# Patient Record
Sex: MALE
[De-identification: names, ages, dates, MRNs, and addresses within clinical notes are randomized per-mention and may not be internally consistent; named-entity substitution may affect disease eponyms.]

---

## 2017-07-30 ENCOUNTER — CHARTING TRANS (OUTPATIENT)
Dept: OTHER | Age: 27
End: 2017-07-30

## 2018-11-03 VITALS
HEART RATE: 92 BPM | HEIGHT: 69 IN | SYSTOLIC BLOOD PRESSURE: 150 MMHG | DIASTOLIC BLOOD PRESSURE: 100 MMHG | TEMPERATURE: 98.9 F | RESPIRATION RATE: 16 BRPM | WEIGHT: 191.91 LBS | BODY MASS INDEX: 28.42 KG/M2

## 2020-07-30 ENCOUNTER — APPOINTMENT (OUTPATIENT)
Dept: URGENT CARE | Age: 30
End: 2020-07-30

## 2020-07-30 ENCOUNTER — TELEPHONE (OUTPATIENT)
Dept: SCHEDULING | Age: 30
End: 2020-07-30

## 2020-08-01 ENCOUNTER — WALK IN (OUTPATIENT)
Dept: URGENT CARE | Age: 30
End: 2020-08-01

## 2020-08-01 ENCOUNTER — TELEPHONE (OUTPATIENT)
Dept: SCHEDULING | Age: 30
End: 2020-08-01

## 2020-08-01 VITALS — TEMPERATURE: 99.2 F

## 2020-08-01 DIAGNOSIS — H10.31 ACUTE BACTERIAL CONJUNCTIVITIS OF RIGHT EYE: Primary | ICD-10-CM

## 2020-08-01 PROCEDURE — 99203 OFFICE O/P NEW LOW 30 MIN: CPT | Performed by: NURSE PRACTITIONER

## 2020-08-01 RX ORDER — TOBRAMYCIN 3 MG/ML
1 SOLUTION/ DROPS OPHTHALMIC 4 TIMES DAILY
Qty: 5 ML | Refills: 0 | Status: SHIPPED | OUTPATIENT
Start: 2020-08-01 | End: 2020-08-08

## 2020-08-01 ASSESSMENT — ENCOUNTER SYMPTOMS
EYE PAIN: 0
EYE DISCHARGE: 1
PSYCHIATRIC NEGATIVE: 1
CONSTITUTIONAL NEGATIVE: 1
EYE REDNESS: 1
GASTROINTESTINAL NEGATIVE: 1
ALLERGIC/IMMUNOLOGIC NEGATIVE: 1
HEMATOLOGIC/LYMPHATIC NEGATIVE: 1
PHOTOPHOBIA: 0
RESPIRATORY NEGATIVE: 1
EYE ITCHING: 0
NEUROLOGICAL NEGATIVE: 1

## 2021-01-01 ENCOUNTER — HOSPITAL ENCOUNTER (INPATIENT)
Dept: GENERAL RADIOLOGY | Facility: HOSPITAL | Age: 31
Discharge: HOME | DRG: 974 | End: 2021-01-01
Attending: RADIOLOGY
Payer: MEDICAID

## 2021-01-01 ENCOUNTER — APPOINTMENT (OUTPATIENT)
Dept: GENERAL RADIOLOGY | Facility: HOSPITAL | Age: 31
DRG: 974 | End: 2021-01-01
Attending: INTERNAL MEDICINE
Payer: MEDICAID

## 2021-01-01 ENCOUNTER — APPOINTMENT (OUTPATIENT)
Dept: GENERAL RADIOLOGY | Facility: HOSPITAL | Age: 31
DRG: 974 | End: 2021-01-01
Attending: EMERGENCY MEDICINE
Payer: MEDICAID

## 2021-01-01 ENCOUNTER — HOSPITAL ENCOUNTER (INPATIENT)
Facility: HOSPITAL | Age: 31
LOS: 24 days | DRG: 974 | End: 2021-01-01
Attending: EMERGENCY MEDICINE | Admitting: INTERNAL MEDICINE
Payer: MEDICAID

## 2021-01-01 ENCOUNTER — APPOINTMENT (OUTPATIENT)
Dept: GENERAL RADIOLOGY | Facility: HOSPITAL | Age: 31
DRG: 974 | End: 2021-01-01
Attending: CLINICAL NURSE SPECIALIST
Payer: MEDICAID

## 2021-01-01 ENCOUNTER — ANESTHESIA (OUTPATIENT)
Dept: ENDOSCOPY | Facility: HOSPITAL | Age: 31
DRG: 974 | End: 2021-01-01
Payer: MEDICAID

## 2021-01-01 ENCOUNTER — APPOINTMENT (OUTPATIENT)
Dept: GENERAL RADIOLOGY | Facility: HOSPITAL | Age: 31
DRG: 974 | End: 2021-01-01
Attending: HOSPITALIST
Payer: MEDICAID

## 2021-01-01 ENCOUNTER — ANESTHESIA EVENT (OUTPATIENT)
Dept: ENDOSCOPY | Facility: HOSPITAL | Age: 31
DRG: 974 | End: 2021-01-01
Payer: MEDICAID

## 2021-01-01 ENCOUNTER — APPOINTMENT (OUTPATIENT)
Dept: CV DIAGNOSTICS | Facility: HOSPITAL | Age: 31
DRG: 974 | End: 2021-01-01
Attending: HOSPITALIST
Payer: MEDICAID

## 2021-01-01 ENCOUNTER — APPOINTMENT (OUTPATIENT)
Dept: INTERVENTIONAL RADIOLOGY/VASCULAR | Facility: HOSPITAL | Age: 31
DRG: 974 | End: 2021-01-01
Attending: INTERNAL MEDICINE
Payer: MEDICAID

## 2021-01-01 ENCOUNTER — APPOINTMENT (OUTPATIENT)
Dept: ULTRASOUND IMAGING | Facility: HOSPITAL | Age: 31
DRG: 974 | End: 2021-01-01
Attending: INTERNAL MEDICINE
Payer: MEDICAID

## 2021-01-01 ENCOUNTER — APPOINTMENT (OUTPATIENT)
Dept: CT IMAGING | Facility: HOSPITAL | Age: 31
DRG: 974 | End: 2021-01-01
Attending: EMERGENCY MEDICINE
Payer: MEDICAID

## 2021-01-01 VITALS
DIASTOLIC BLOOD PRESSURE: 52 MMHG | WEIGHT: 182.75 LBS | OXYGEN SATURATION: 92 % | SYSTOLIC BLOOD PRESSURE: 97 MMHG | TEMPERATURE: 98 F | BODY MASS INDEX: 27.07 KG/M2 | HEIGHT: 69 IN

## 2021-01-01 DIAGNOSIS — R09.02 HYPOXIA: Primary | ICD-10-CM

## 2021-01-01 DIAGNOSIS — J18.9 PNEUMONIA OF BOTH LUNGS DUE TO INFECTIOUS ORGANISM, UNSPECIFIED PART OF LUNG: ICD-10-CM

## 2021-01-01 PROCEDURE — 71045 X-RAY EXAM CHEST 1 VIEW: CPT | Performed by: INTERNAL MEDICINE

## 2021-01-01 PROCEDURE — 5A1D90Z PERFORMANCE OF URINARY FILTRATION, CONTINUOUS, GREATER THAN 18 HOURS PER DAY: ICD-10-PCS | Performed by: INTERNAL MEDICINE

## 2021-01-01 PROCEDURE — 5A0945Z ASSISTANCE WITH RESPIRATORY VENTILATION, 24-96 CONSECUTIVE HOURS: ICD-10-PCS | Performed by: HOSPITALIST

## 2021-01-01 PROCEDURE — 0W9B00Z DRAINAGE OF LEFT PLEURAL CAVITY WITH DRAINAGE DEVICE, OPEN APPROACH: ICD-10-PCS | Performed by: INTERNAL MEDICINE

## 2021-01-01 PROCEDURE — 99233 SBSQ HOSP IP/OBS HIGH 50: CPT | Performed by: INTERNAL MEDICINE

## 2021-01-01 PROCEDURE — 0B9K8ZX DRAINAGE OF RIGHT LUNG, VIA NATURAL OR ARTIFICIAL OPENING ENDOSCOPIC, DIAGNOSTIC: ICD-10-PCS | Performed by: INTERNAL MEDICINE

## 2021-01-01 PROCEDURE — 71045 X-RAY EXAM CHEST 1 VIEW: CPT | Performed by: HOSPITALIST

## 2021-01-01 PROCEDURE — 99233 SBSQ HOSP IP/OBS HIGH 50: CPT | Performed by: OTHER

## 2021-01-01 PROCEDURE — 71045 X-RAY EXAM CHEST 1 VIEW: CPT | Performed by: EMERGENCY MEDICINE

## 2021-01-01 PROCEDURE — 5A1955Z RESPIRATORY VENTILATION, GREATER THAN 96 CONSECUTIVE HOURS: ICD-10-PCS | Performed by: HOSPITALIST

## 2021-01-01 PROCEDURE — 99255 IP/OBS CONSLTJ NEW/EST HI 80: CPT | Performed by: INTERNAL MEDICINE

## 2021-01-01 PROCEDURE — 71260 CT THORAX DX C+: CPT | Performed by: EMERGENCY MEDICINE

## 2021-01-01 PROCEDURE — 99291 CRITICAL CARE FIRST HOUR: CPT | Performed by: INTERNAL MEDICINE

## 2021-01-01 PROCEDURE — 31624 DX BRONCHOSCOPE/LAVAGE: CPT | Performed by: INTERNAL MEDICINE

## 2021-01-01 PROCEDURE — 99291 CRITICAL CARE FIRST HOUR: CPT | Performed by: OTHER

## 2021-01-01 PROCEDURE — 3E033XZ INTRODUCTION OF VASOPRESSOR INTO PERIPHERAL VEIN, PERCUTANEOUS APPROACH: ICD-10-PCS | Performed by: HOSPITALIST

## 2021-01-01 PROCEDURE — 76770 US EXAM ABDO BACK WALL COMP: CPT | Performed by: INTERNAL MEDICINE

## 2021-01-01 PROCEDURE — 02H633Z INSERTION OF INFUSION DEVICE INTO RIGHT ATRIUM, PERCUTANEOUS APPROACH: ICD-10-PCS | Performed by: RADIOLOGY

## 2021-01-01 PROCEDURE — 71045 X-RAY EXAM CHEST 1 VIEW: CPT | Performed by: CLINICAL NURSE SPECIALIST

## 2021-01-01 PROCEDURE — 99232 SBSQ HOSP IP/OBS MODERATE 35: CPT | Performed by: INTERNAL MEDICINE

## 2021-01-01 PROCEDURE — 02H633Z INSERTION OF INFUSION DEVICE INTO RIGHT ATRIUM, PERCUTANEOUS APPROACH: ICD-10-PCS | Performed by: HOSPITALIST

## 2021-01-01 PROCEDURE — 32556 INSERT CATH PLEURA W/O IMAGE: CPT | Performed by: INTERNAL MEDICINE

## 2021-01-01 PROCEDURE — 0JH63XZ INSERTION OF TUNNELED VASCULAR ACCESS DEVICE INTO CHEST SUBCUTANEOUS TISSUE AND FASCIA, PERCUTANEOUS APPROACH: ICD-10-PCS | Performed by: RADIOLOGY

## 2021-01-01 PROCEDURE — 93970 EXTREMITY STUDY: CPT | Performed by: INTERNAL MEDICINE

## 2021-01-01 PROCEDURE — 71045 X-RAY EXAM CHEST 1 VIEW: CPT | Performed by: RADIOLOGY

## 2021-01-01 PROCEDURE — 0W9900Z DRAINAGE OF RIGHT PLEURAL CAVITY WITH DRAINAGE DEVICE, OPEN APPROACH: ICD-10-PCS | Performed by: INTERNAL MEDICINE

## 2021-01-01 PROCEDURE — 93306 TTE W/DOPPLER COMPLETE: CPT | Performed by: HOSPITALIST

## 2021-01-01 PROCEDURE — 0BH17EZ INSERTION OF ENDOTRACHEAL AIRWAY INTO TRACHEA, VIA NATURAL OR ARTIFICIAL OPENING: ICD-10-PCS | Performed by: HOSPITALIST

## 2021-01-01 PROCEDURE — 5A09357 ASSISTANCE WITH RESPIRATORY VENTILATION, LESS THAN 24 CONSECUTIVE HOURS, CONTINUOUS POSITIVE AIRWAY PRESSURE: ICD-10-PCS | Performed by: HOSPITALIST

## 2021-01-01 PROCEDURE — 90792 PSYCH DIAG EVAL W/MED SRVCS: CPT | Performed by: OTHER

## 2021-01-01 RX ORDER — HEPARIN SODIUM 1000 [USP'U]/ML
INJECTION, SOLUTION INTRAVENOUS; SUBCUTANEOUS
Status: COMPLETED
Start: 2021-01-01 | End: 2021-01-01

## 2021-01-01 RX ORDER — ALPRAZOLAM 1 MG/1
2 TABLET ORAL 4 TIMES DAILY PRN
Status: DISCONTINUED | OUTPATIENT
Start: 2021-01-01 | End: 2021-01-01

## 2021-01-01 RX ORDER — DEXMEDETOMIDINE HYDROCHLORIDE 4 UG/ML
INJECTION, SOLUTION INTRAVENOUS CONTINUOUS
Status: DISCONTINUED | OUTPATIENT
Start: 2021-01-01 | End: 2021-01-01

## 2021-01-01 RX ORDER — HALOPERIDOL 5 MG/ML
0.25 INJECTION INTRAMUSCULAR ONCE AS NEEDED
Status: DISCONTINUED | OUTPATIENT
Start: 2021-01-01 | End: 2021-01-01 | Stop reason: HOSPADM

## 2021-01-01 RX ORDER — LORAZEPAM 2 MG/ML
1 INJECTION INTRAMUSCULAR EVERY 4 HOURS PRN
Status: DISCONTINUED | OUTPATIENT
Start: 2021-01-01 | End: 2021-01-01

## 2021-01-01 RX ORDER — HEPARIN SODIUM 1000 [USP'U]/ML
1.5 INJECTION, SOLUTION INTRAVENOUS; SUBCUTANEOUS AS NEEDED
Status: DISCONTINUED | OUTPATIENT
Start: 2021-01-01 | End: 2021-01-01

## 2021-01-01 RX ORDER — CALCIUM CARBONATE 200(500)MG
1 TABLET,CHEWABLE ORAL NIGHTLY PRN
COMMUNITY

## 2021-01-01 RX ORDER — HYDROCODONE BITARTRATE AND ACETAMINOPHEN 5; 325 MG/1; MG/1
2 TABLET ORAL AS NEEDED
Status: DISCONTINUED | OUTPATIENT
Start: 2021-01-01 | End: 2021-01-01 | Stop reason: HOSPADM

## 2021-01-01 RX ORDER — MORPHINE SULFATE 4 MG/ML
INJECTION, SOLUTION INTRAMUSCULAR; INTRAVENOUS
Status: COMPLETED
Start: 2021-01-01 | End: 2021-01-01

## 2021-01-01 RX ORDER — HYDROCODONE BITARTRATE AND ACETAMINOPHEN 5; 325 MG/1; MG/1
1 TABLET ORAL AS NEEDED
Status: DISCONTINUED | OUTPATIENT
Start: 2021-01-01 | End: 2021-01-01 | Stop reason: HOSPADM

## 2021-01-01 RX ORDER — ALBUMIN (HUMAN) 12.5 G/50ML
SOLUTION INTRAVENOUS
Status: COMPLETED
Start: 2021-01-01 | End: 2021-01-01

## 2021-01-01 RX ORDER — LORAZEPAM 2 MG/ML
INJECTION INTRAMUSCULAR
Status: DISPENSED
Start: 2021-01-01 | End: 2021-01-01

## 2021-01-01 RX ORDER — HEPARIN SODIUM 5000 [USP'U]/ML
5000 INJECTION, SOLUTION INTRAVENOUS; SUBCUTANEOUS EVERY 8 HOURS SCHEDULED
Status: DISCONTINUED | OUTPATIENT
Start: 2021-01-01 | End: 2021-01-01

## 2021-01-01 RX ORDER — PREDNISONE 20 MG/1
40 TABLET ORAL 2 TIMES DAILY WITH MEALS
Status: COMPLETED | OUTPATIENT
Start: 2021-01-01 | End: 2021-01-01

## 2021-01-01 RX ORDER — MORPHINE SULFATE 10 MG/ML
6 INJECTION, SOLUTION INTRAMUSCULAR; INTRAVENOUS EVERY 10 MIN PRN
Status: DISCONTINUED | OUTPATIENT
Start: 2021-01-01 | End: 2021-01-01 | Stop reason: HOSPADM

## 2021-01-01 RX ORDER — CODEINE PHOSPHATE AND GUAIFENESIN 10; 100 MG/5ML; MG/5ML
5 SOLUTION ORAL EVERY 6 HOURS PRN
Status: DISCONTINUED | OUTPATIENT
Start: 2021-01-01 | End: 2021-01-01

## 2021-01-01 RX ORDER — POTASSIUM CHLORIDE 14.9 MG/ML
20 INJECTION INTRAVENOUS ONCE
Status: DISCONTINUED | OUTPATIENT
Start: 2021-01-01 | End: 2021-01-01

## 2021-01-01 RX ORDER — PREDNISONE 1 MG/1
5 TABLET ORAL
Status: DISCONTINUED | OUTPATIENT
Start: 2021-01-01 | End: 2021-01-01

## 2021-01-01 RX ORDER — DEXMEDETOMIDINE HYDROCHLORIDE 4 UG/ML
INJECTION, SOLUTION INTRAVENOUS
Status: COMPLETED
Start: 2021-01-01 | End: 2021-01-01

## 2021-01-01 RX ORDER — BENZONATATE 100 MG/1
100 CAPSULE ORAL 3 TIMES DAILY PRN
COMMUNITY

## 2021-01-01 RX ORDER — PROCHLORPERAZINE EDISYLATE 5 MG/ML
5 INJECTION INTRAMUSCULAR; INTRAVENOUS ONCE AS NEEDED
Status: DISCONTINUED | OUTPATIENT
Start: 2021-01-01 | End: 2021-01-01 | Stop reason: HOSPADM

## 2021-01-01 RX ORDER — IBUPROFEN 200 MG
200 TABLET ORAL EVERY 6 HOURS PRN
Status: DISCONTINUED | OUTPATIENT
Start: 2021-01-01 | End: 2021-01-01

## 2021-01-01 RX ORDER — DEXTROSE MONOHYDRATE 25 G/50ML
50 INJECTION, SOLUTION INTRAVENOUS AS NEEDED
Status: DISCONTINUED | OUTPATIENT
Start: 2021-01-01 | End: 2021-01-01

## 2021-01-01 RX ORDER — ERGOCALCIFEROL 1.25 MG/1
CAPSULE ORAL
COMMUNITY

## 2021-01-01 RX ORDER — SODIUM CHLORIDE 9 MG/ML
INJECTION, SOLUTION INTRAVENOUS CONTINUOUS
Status: DISCONTINUED | OUTPATIENT
Start: 2021-01-01 | End: 2021-01-01

## 2021-01-01 RX ORDER — POTASSIUM CHLORIDE 1.5 G/1.77G
40 POWDER, FOR SOLUTION ORAL ONCE
Status: COMPLETED | OUTPATIENT
Start: 2021-01-01 | End: 2021-01-01

## 2021-01-01 RX ORDER — IBUPROFEN 200 MG
200 TABLET ORAL EVERY 6 HOURS PRN
COMMUNITY

## 2021-01-01 RX ORDER — HEPARIN SODIUM 5000 [USP'U]/ML
5000 INJECTION, SOLUTION INTRAVENOUS; SUBCUTANEOUS ONCE
Status: COMPLETED | OUTPATIENT
Start: 2021-01-01 | End: 2021-01-01

## 2021-01-01 RX ORDER — PREDNISONE 20 MG/1
20 TABLET ORAL
Status: DISCONTINUED | OUTPATIENT
Start: 2021-01-01 | End: 2021-01-01

## 2021-01-01 RX ORDER — SODIUM POLYSTYRENE SULFONATE 4.1 MEQ/G
60 POWDER, FOR SUSPENSION ORAL; RECTAL ONCE
Status: DISCONTINUED | OUTPATIENT
Start: 2021-01-01 | End: 2021-01-01

## 2021-01-01 RX ORDER — SENNOSIDES 8.8 MG/5ML
5 LIQUID ORAL 2 TIMES DAILY
Status: DISCONTINUED | OUTPATIENT
Start: 2021-01-01 | End: 2021-01-01

## 2021-01-01 RX ORDER — HYDROMORPHONE HYDROCHLORIDE 1 MG/ML
0.2 INJECTION, SOLUTION INTRAMUSCULAR; INTRAVENOUS; SUBCUTANEOUS EVERY 5 MIN PRN
Status: DISCONTINUED | OUTPATIENT
Start: 2021-01-01 | End: 2021-01-01 | Stop reason: HOSPADM

## 2021-01-01 RX ORDER — PANTOPRAZOLE SODIUM 40 MG/1
40 TABLET, DELAYED RELEASE ORAL
Status: DISCONTINUED | OUTPATIENT
Start: 2021-01-01 | End: 2021-01-01

## 2021-01-01 RX ORDER — SODIUM CHLORIDE, SODIUM LACTATE, POTASSIUM CHLORIDE, CALCIUM CHLORIDE 600; 310; 30; 20 MG/100ML; MG/100ML; MG/100ML; MG/100ML
INJECTION, SOLUTION INTRAVENOUS CONTINUOUS PRN
Status: DISCONTINUED | OUTPATIENT
Start: 2021-01-01 | End: 2021-01-01 | Stop reason: SURG

## 2021-01-01 RX ORDER — LIDOCAINE HYDROCHLORIDE 20 MG/ML
INJECTION, SOLUTION EPIDURAL; INFILTRATION; INTRACAUDAL; PERINEURAL
Status: DISCONTINUED
Start: 2021-01-01 | End: 2021-01-01 | Stop reason: WASHOUT

## 2021-01-01 RX ORDER — ENOXAPARIN SODIUM 100 MG/ML
40 INJECTION SUBCUTANEOUS NIGHTLY
Status: DISCONTINUED | OUTPATIENT
Start: 2021-01-01 | End: 2021-01-01

## 2021-01-01 RX ORDER — PREDNISONE 20 MG/1
40 TABLET ORAL
Status: DISCONTINUED | OUTPATIENT
Start: 2021-01-01 | End: 2021-01-01

## 2021-01-01 RX ORDER — LORAZEPAM 2 MG/ML
0.5 INJECTION INTRAMUSCULAR ONCE
Status: COMPLETED | OUTPATIENT
Start: 2021-01-01 | End: 2021-01-01

## 2021-01-01 RX ORDER — CALCIUM GLUCONATE 94 MG/ML
1 INJECTION, SOLUTION INTRAVENOUS ONCE
Status: COMPLETED | OUTPATIENT
Start: 2021-01-01 | End: 2021-01-01

## 2021-01-01 RX ORDER — POTASSIUM CHLORIDE 1.5 G/1.77G
20 POWDER, FOR SOLUTION ORAL ONCE
Status: COMPLETED | OUTPATIENT
Start: 2021-01-01 | End: 2021-01-01

## 2021-01-01 RX ORDER — NALOXONE HYDROCHLORIDE 0.4 MG/ML
80 INJECTION, SOLUTION INTRAMUSCULAR; INTRAVENOUS; SUBCUTANEOUS AS NEEDED
Status: DISCONTINUED | OUTPATIENT
Start: 2021-01-01 | End: 2021-01-01 | Stop reason: HOSPADM

## 2021-01-01 RX ORDER — FUROSEMIDE 10 MG/ML
40 INJECTION INTRAMUSCULAR; INTRAVENOUS ONCE
Status: COMPLETED | OUTPATIENT
Start: 2021-01-01 | End: 2021-01-01

## 2021-01-01 RX ORDER — ECHINACEA PURPUREA EXTRACT 125 MG
1 TABLET ORAL
Status: DISCONTINUED | OUTPATIENT
Start: 2021-01-01 | End: 2021-01-01

## 2021-01-01 RX ORDER — METOCLOPRAMIDE HYDROCHLORIDE 5 MG/ML
10 INJECTION INTRAMUSCULAR; INTRAVENOUS ONCE
Status: COMPLETED | OUTPATIENT
Start: 2021-01-01 | End: 2021-01-01

## 2021-01-01 RX ORDER — MORPHINE SULFATE 4 MG/ML
4 INJECTION, SOLUTION INTRAMUSCULAR; INTRAVENOUS EVERY 10 MIN PRN
Status: DISCONTINUED | OUTPATIENT
Start: 2021-01-01 | End: 2021-01-01 | Stop reason: HOSPADM

## 2021-01-01 RX ORDER — IPRATROPIUM BROMIDE AND ALBUTEROL SULFATE 2.5; .5 MG/3ML; MG/3ML
3 SOLUTION RESPIRATORY (INHALATION) EVERY 6 HOURS PRN
Status: DISCONTINUED | OUTPATIENT
Start: 2021-01-01 | End: 2021-01-01

## 2021-01-01 RX ORDER — LIDOCAINE HYDROCHLORIDE 20 MG/ML
INJECTION, SOLUTION EPIDURAL; INFILTRATION; INTRACAUDAL; PERINEURAL
Status: COMPLETED
Start: 2021-01-01 | End: 2021-01-01

## 2021-01-01 RX ORDER — HEPARIN SODIUM 1000 [USP'U]/ML
INJECTION, SOLUTION INTRAVENOUS; SUBCUTANEOUS
Status: DISCONTINUED
Start: 2021-01-01 | End: 2021-01-01 | Stop reason: WASHOUT

## 2021-01-01 RX ORDER — CALCIUM CARBONATE 200(500)MG
1000 TABLET,CHEWABLE ORAL EVERY 6 HOURS PRN
Status: DISCONTINUED | OUTPATIENT
Start: 2021-01-01 | End: 2021-01-01

## 2021-01-01 RX ORDER — MORPHINE SULFATE 4 MG/ML
2 INJECTION, SOLUTION INTRAMUSCULAR; INTRAVENOUS EVERY 10 MIN PRN
Status: DISCONTINUED | OUTPATIENT
Start: 2021-01-01 | End: 2021-01-01 | Stop reason: HOSPADM

## 2021-01-01 RX ORDER — MINERAL OIL AND PETROLATUM 150; 830 MG/G; MG/G
1 OINTMENT OPHTHALMIC 2 TIMES DAILY
Status: DISCONTINUED | OUTPATIENT
Start: 2021-01-01 | End: 2021-01-01

## 2021-01-01 RX ORDER — DEXTROSE MONOHYDRATE 25 G/50ML
50 INJECTION, SOLUTION INTRAVENOUS ONCE
Status: COMPLETED | OUTPATIENT
Start: 2021-01-01 | End: 2021-01-01

## 2021-01-01 RX ORDER — ACETAMINOPHEN 10 MG/ML
INJECTION, SOLUTION INTRAVENOUS
Status: DISPENSED
Start: 2021-01-01 | End: 2021-01-01

## 2021-01-01 RX ORDER — LORAZEPAM 2 MG/ML
2 INJECTION INTRAMUSCULAR ONCE
Status: COMPLETED | OUTPATIENT
Start: 2021-01-01 | End: 2021-01-01

## 2021-01-01 RX ORDER — HYDROMORPHONE HYDROCHLORIDE 1 MG/ML
0.6 INJECTION, SOLUTION INTRAMUSCULAR; INTRAVENOUS; SUBCUTANEOUS EVERY 5 MIN PRN
Status: DISCONTINUED | OUTPATIENT
Start: 2021-01-01 | End: 2021-01-01 | Stop reason: HOSPADM

## 2021-01-01 RX ORDER — POTASSIUM CHLORIDE 20 MEQ/1
40 TABLET, EXTENDED RELEASE ORAL ONCE
Status: COMPLETED | OUTPATIENT
Start: 2021-01-01 | End: 2021-01-01

## 2021-01-01 RX ORDER — BENZONATATE 100 MG/1
100 CAPSULE ORAL 3 TIMES DAILY PRN
Status: DISCONTINUED | OUTPATIENT
Start: 2021-01-01 | End: 2021-01-01

## 2021-01-01 RX ORDER — ENOXAPARIN SODIUM 100 MG/ML
30 INJECTION SUBCUTANEOUS NIGHTLY
Status: DISCONTINUED | OUTPATIENT
Start: 2021-01-01 | End: 2021-01-01

## 2021-01-01 RX ORDER — POTASSIUM CHLORIDE 29.8 MG/ML
40 INJECTION INTRAVENOUS ONCE
Status: COMPLETED | OUTPATIENT
Start: 2021-01-01 | End: 2021-01-01

## 2021-01-01 RX ORDER — METOCLOPRAMIDE HYDROCHLORIDE 5 MG/ML
5 INJECTION INTRAMUSCULAR; INTRAVENOUS ONCE
Status: COMPLETED | OUTPATIENT
Start: 2021-01-01 | End: 2021-01-01

## 2021-01-01 RX ORDER — VANCOMYCIN 2 GRAM/500 ML IN 0.9 % SODIUM CHLORIDE INTRAVENOUS
25 ONCE
Status: COMPLETED | OUTPATIENT
Start: 2021-01-01 | End: 2021-01-01

## 2021-01-01 RX ORDER — PREDNISONE 10 MG/1
10 TABLET ORAL
Status: DISCONTINUED | OUTPATIENT
Start: 2021-01-01 | End: 2021-01-01

## 2021-01-01 RX ORDER — VANCOMYCIN HYDROCHLORIDE
15 EVERY 12 HOURS
Status: DISCONTINUED | OUTPATIENT
Start: 2021-01-01 | End: 2021-01-01

## 2021-01-01 RX ORDER — SULFAMETHOXAZOLE AND TRIMETHOPRIM 80; 16 MG/ML; MG/ML
5 INJECTION, SOLUTION, CONCENTRATE INTRAVENOUS EVERY 8 HOURS
Status: DISCONTINUED | OUTPATIENT
Start: 2021-01-01 | End: 2021-01-01

## 2021-01-01 RX ORDER — MIRTAZAPINE 7.5 MG/1
7.5 TABLET, FILM COATED ORAL NIGHTLY
Status: DISCONTINUED | OUTPATIENT
Start: 2021-01-01 | End: 2021-01-01

## 2021-01-01 RX ORDER — SODIUM CHLORIDE, SODIUM LACTATE, POTASSIUM CHLORIDE, CALCIUM CHLORIDE 600; 310; 30; 20 MG/100ML; MG/100ML; MG/100ML; MG/100ML
INJECTION, SOLUTION INTRAVENOUS CONTINUOUS
Status: DISCONTINUED | OUTPATIENT
Start: 2021-01-01 | End: 2021-01-01

## 2021-01-01 RX ORDER — MORPHINE SULFATE 4 MG/ML
4 INJECTION, SOLUTION INTRAMUSCULAR; INTRAVENOUS EVERY 4 HOURS PRN
Status: DISCONTINUED | OUTPATIENT
Start: 2021-01-01 | End: 2021-01-01

## 2021-01-01 RX ORDER — METHYLPREDNISOLONE SODIUM SUCCINATE 40 MG/ML
40 INJECTION, POWDER, LYOPHILIZED, FOR SOLUTION INTRAMUSCULAR; INTRAVENOUS EVERY 12 HOURS
Status: DISCONTINUED | OUTPATIENT
Start: 2021-01-01 | End: 2021-01-01

## 2021-01-01 RX ORDER — ACETAMINOPHEN 325 MG/1
650 TABLET ORAL EVERY 6 HOURS PRN
Status: DISCONTINUED | OUTPATIENT
Start: 2021-01-01 | End: 2021-01-01

## 2021-01-01 RX ORDER — ACETAMINOPHEN 500 MG
1000 TABLET ORAL ONCE
Status: COMPLETED | OUTPATIENT
Start: 2021-01-01 | End: 2021-01-01

## 2021-01-01 RX ORDER — HYDROMORPHONE HYDROCHLORIDE 1 MG/ML
0.4 INJECTION, SOLUTION INTRAMUSCULAR; INTRAVENOUS; SUBCUTANEOUS EVERY 5 MIN PRN
Status: DISCONTINUED | OUTPATIENT
Start: 2021-01-01 | End: 2021-01-01 | Stop reason: HOSPADM

## 2021-01-01 RX ORDER — FLUTICASONE PROPIONATE 50 MCG
1 SPRAY, SUSPENSION (ML) NASAL DAILY
COMMUNITY

## 2021-01-01 RX ORDER — ACETAMINOPHEN 10 MG/ML
1000 INJECTION, SOLUTION INTRAVENOUS EVERY 6 HOURS PRN
Status: DISCONTINUED | OUTPATIENT
Start: 2021-01-01 | End: 2021-01-01

## 2021-01-01 RX ORDER — POTASSIUM CHLORIDE 14.9 MG/ML
INJECTION INTRAVENOUS
Status: COMPLETED
Start: 2021-01-01 | End: 2021-01-01

## 2021-01-01 RX ORDER — LORATADINE 10 MG/1
10 TABLET ORAL DAILY
COMMUNITY

## 2021-01-01 RX ORDER — ONDANSETRON 2 MG/ML
4 INJECTION INTRAMUSCULAR; INTRAVENOUS ONCE AS NEEDED
Status: DISCONTINUED | OUTPATIENT
Start: 2021-01-01 | End: 2021-01-01 | Stop reason: HOSPADM

## 2021-01-01 RX ADMIN — SODIUM CHLORIDE, SODIUM LACTATE, POTASSIUM CHLORIDE, CALCIUM CHLORIDE: 600; 310; 30; 20 INJECTION, SOLUTION INTRAVENOUS at 16:56:00

## 2021-01-01 RX ADMIN — SODIUM CHLORIDE, SODIUM LACTATE, POTASSIUM CHLORIDE, CALCIUM CHLORIDE: 600; 310; 30; 20 INJECTION, SOLUTION INTRAVENOUS at 16:45:00

## 2021-12-05 PROBLEM — R09.02 HYPOXIA: Status: ACTIVE | Noted: 2021-01-01

## 2021-12-05 PROBLEM — J18.9 PNEUMONIA OF BOTH LUNGS DUE TO INFECTIOUS ORGANISM, UNSPECIFIED PART OF LUNG: Status: ACTIVE | Noted: 2021-01-01

## 2021-12-05 NOTE — ED INITIAL ASSESSMENT (HPI)
Patient here with shortness of breath since Tuesday, patient took covid test on Tuesday morning which was negative. Patient here with worsening shortness of breath.  82% on RA, placed on 2L/min NC.

## 2021-12-05 NOTE — ED PROVIDER NOTES
Patient Seen in: [facility] AND [facility] Emergency Department      History   Patient presents with:  Difficulty Breathing    Stated Complaint: Cough, sob    Subjective:   HPI  Patient is a 47-year-old male history of asthma as a child, ex-smoker presenting wit Conjunctivitis b/l   Cardiovascular:      Rate and Rhythm: Regular rhythm. Tachycardia present. Pulmonary:      Effort: Pulmonary effort is normal. Tachypnea present. No respiratory distress. Breath sounds: Normal breath sounds.    Abdominal:      Ge within normal limits   TROPONIN I - Normal   PROCALCITONIN - Normal   LACTIC ACID, PLASMA - Normal   PRO BETA NATRIURETIC PEPTIDE - Normal   RAPID SARS-COV-2 BY PCR - Normal   RESPIRATORY FLU EXPAND PANEL + COVID-19 - Normal    Narrative:      This test is In process                   Please view results for these tests on the individual orders.    HIV AG AB COMBO   HIV AG AB COMBO LAVENDER HOLD   LEGIONELLA URINE AG SEROGRP 1   STREPTOCOCCUS PNEUMONIAE AG, URINE   HISTOPLASMA ANTIGEN, URINE   SED RATE, JAMSHID fever.  Arrived in no acute distress. Fever 103.3. Patient tachycardic. Tachypneic and hypoxic. 82% on room air. Patient placed on supplemental oxygen 2 L with improvement in oxygen saturation.   Fever treated and IV fluids given with improvement in vi

## 2021-12-05 NOTE — CONSULTS
[facility] [place] [facility]    Consult Note    Date:  12/5/2021  Date of Admission:  12/5/2021      Chief Complaint:   Bertha Dakins is a(n) 33year old male with shortness of breath.     HPI:   The patient notes that beginning 5 days ago he had onset of Abdomen nontender, without hepatosplenomegaly and no mass appreciable. Extremities without clubbing cyanosis nor edema. Neurologic grossly intact with symmetric tone and strength and reflex.   Skin without gross abnormality    Results:     Lab Results PCR.    Recommendations:  1. Ceftriaxone and azithromycin  2. Legionella and streptococcal urinary antigens  3. Recheck Covid PCR  4. Sedimentation rate  5. Antineutrophil cytoplasmic antibodies  6. HIV test  7. We will consider steroid    2.   DVT p

## 2021-12-05 NOTE — ED QUICK NOTES
Pt to ED with c/o worsening cough, fever, and dyspnea since Sunday. Pt states he saw his PCP on Monday with same complaint. Pt states negative COVID and negative Flu test on Monday. Bilateral lungs diminished. Pt with hx of smoking.  Pt arrived to room 14 f

## 2021-12-05 NOTE — ED QUICK NOTES
Orders for admission, patient is aware of plan and ready to go upstairs. Any questions, please call ED BUDDY pink  at extension [phone].    Type of COVID test sent:rapid negative  COVID Suspicion level: Low    Titratable drug(s) infusing:  Rate: n/a    LOC at

## 2021-12-06 NOTE — PLAN OF CARE
Problem: Patient/Family Goals  Goal: Patient/Family Short Term Goal  Description: Patient's Short Term Goal: \"get better,able to do things without getting short of breath    Interventions:   - monitor labs and vital signs  -administer o2 prn  -assist wi

## 2021-12-06 NOTE — PROGRESS NOTES
[facility]    Progress Note      Assessment and Plan:   1. Pneumonitis–the patient now admits to having been diagnosed HIV-positive recently. Findings are concerning for PCP pneumonia. Bactrim added by infectious disease.   We will procee 12/05/2021    TROP <0.045 12/05/2021       Ruthie Rincon MD  Medical Director, [address] [facility]  Medical Director, [address] [place] [address] [address]  Pager: [phone]

## 2021-12-06 NOTE — CONSULTS
[place] [facility] ID CONSULT NOTE    Satish Lacy Patient Status:  Inpatient    10/1/1990 MRN [id number]   Location [facility] 5SW/SE Attending [address], [address] Day # 1 PCP No primary care provider on file Allergies    Medications:    Current Facility-Administered Medications:   •  cefTRIAXone Sodium (ROCEPHIN) 1 g in sodium chloride 0.9% 100 mL IVPB-ADDV, 1 g, Intravenous, Q24H  •  azithromycin (ZITHROMAX) 500 mg in sodium chloride 0.9% 250 mL IVPB, 500 mg, Musculoskeletal: No edema noted  Integument: No lesions. No erythema.     Laboratory Data:  Recent Labs   Lab 12/05/21  1338   RBC 4.71   HGB 14.3   HCT 42.0   MCV 89.2   MCH 30.4   MCHC 34.0   RDW 11.7   NEPRELIM 4.00   WBC 4.9   .0     Recent Lab illicits although used cocaine about 3 weeks ago   - denies h/o STIs      PLAN:    -  Start IV bactrim and prednisone  -  Continue IV ceftriaxone and azithromycin  -  F/up on COVID-19 PCR - if negative discontinue isolation  -  Plan for bronchoscopy - disc

## 2021-12-06 NOTE — PROGRESS NOTES
Received patient from ED, increased o2 to 5L as O2 in mid to upper 80s, c/o cough and fever, given tylenol and robitussin ac per order, sepsis BPA fired, notified Dr. [address] of patient status, handoff given to oncoming RN

## 2021-12-06 NOTE — PLAN OF CARE
Problem: PAIN - ADULT  Goal: Verbalizes/displays adequate comfort level or patient's stated pain goal  Description: INTERVENTIONS:  - Encourage pt to monitor pain and request assistance  - Assess pain using appropriate pain scale  - Administer analgesics SOB or any respiratory difficulty  - Respiratory Therapy support as indicated  - Manage/alleviate anxiety  - Monitor for signs/symptoms of CO2 retention  Outcome: Not Progressing     Problem: SKIN/TISSUE INTEGRITY - ADULT  Goal: Skin integrity remains [place]

## 2021-12-06 NOTE — H&P
[facility]    PATIENT'S NAME: Lady Ordoñez PHYSICIAN: Judie Vizcaino MD   PATIENT ACCOUNT#:   [id number]    LOCATION:  [address] [place] [address] RECORD #:   [id number]       YOB: 1990  ADMISSION DATE:       12/0 noted.  Further workup in progress. 2.   History of asthma. 3.   DVT prophylaxis. Lovenox once daily. 4.   Nutrition. General diet.     Dictated By Amalia Monet MD  d: 12/05/2021 23:58:14  t: 12/06/2021 03:16:05  [facility] [id number]  /

## 2021-12-07 NOTE — BH PROGRESS NOTE
Psych Liaison was consulted d/t depression with no context in order set, PHQ-4=2 and CSSR=0, Psych Liaison called BUDDY Keller to get context and learn more about Emeterio's case.  BUDDY Keller reported that consultation was ordered to help with brief processing of radha

## 2021-12-07 NOTE — PROGRESS NOTES
INFECTIOUS DISEASE PROGRESS NOTE  [facility] ID PROGRESS [facility]    Jordjanis Show Patient Status:  Inpatient    10/1/1990 MRN [id number]   Location [facility] 5SW/SE Attending Cheryl Morocho [facility] Day # 2 pathogen panel negative. CT chest PE with no PE but extensive bilateral upper and lower groundglass opacity concerning for severe pulmonary edema or multifocal pneumonia. LDH elevated 602. HIV test positive and awaiting confirmation.   Also with lymphope

## 2021-12-07 NOTE — PLAN OF CARE
Problem: Patient/Family Goals  Goal: Patient/Family Short Term Goal  Description: Patient's Short Term Goal: \"get better,able to do things without getting short of breath    Interventions:   - monitor labs and vital signs  -administer o2 prn  -assist wi changes in mentation and behavior  - Position to facilitate oxygenation and minimize respiratory effort  - Oxygen supplementation based on oxygen saturation or ABGs  - Provide Smoking Cessation handout, if applicable  - Encourage broncho-pulmonary hygiene

## 2021-12-07 NOTE — ANESTHESIA POSTPROCEDURE EVALUATION
Patient: Jesi Gonsalez    Procedure Summary     Date: 12/07/21 Room / Location: [address] [place] [address] ENDOSCOPY 05 / [address] [place] [address] ENDOSCOPY    Anesthesia Start: 0830 Anesthesia Stop: 3341    Procedure: BRONCHOSCOPY (N/A ) Diagnosis: (Pneumonia)    Surgeons: Flynn Ramirez MD

## 2021-12-07 NOTE — PAYOR COMM NOTE
--------------  ADMISSION REVIEW     Payor: Pepper Thakur #:  [id number]  Authorization Number: [id number]    Admit date: 12/5/21  Admit time:  5:45 PM       REVIEW DOCUMENTATION:     ED Provider Notes        Patient Seen in: [facility] no guarding or rebound. Musculoskeletal:         General: No swelling, tenderness or deformity. Normal range of motion. Cervical back: Normal range of motion and neck supple. Skin:     General: Skin is warm.       Capillary Refill: Capillary refill or multifocal pneumonia (either from bacterial or atypical viral etiology). Continued follow-up imaging recommended to ensure resolution.      Dictated by (CST): Livan Dewitt MD on 12/05/2021 at 2:09 PM     Finalized by (CST): Livan Dewitt MD on 12/05/2021 Patient placed on IV Rocephin and azithromycin for community-acquired coverage. Formal respiratory panel negative. Influenza negative. Urine antigens ordered. Fungal antigens ordered. HIV screen ordered.  Discussed case with pulmonology and infectious 14.3, platelets 826. Cardiac enzymes were negative. Procalcitonin level of 0.06.  C-reactive protein of 10.1. D-dimer 1.0.    ASSESSMENT AND PLAN:    1. Pneumonia. Continue with IV ceftriaxone and azithromycin. On O2 protocol.   Pulmonary consultatio

## 2021-12-07 NOTE — PLAN OF CARE
Problem: PAIN - ADULT  Goal: Verbalizes/displays adequate comfort level or patient's stated pain goal  Description: INTERVENTIONS:  - Encourage pt to monitor pain and request assistance  - Assess pain using appropriate pain scale  - Administer analgesics behavior  - Position to facilitate oxygenation and minimize respiratory effort  - Oxygen supplementation based on oxygen saturation or ABGs  - Provide Smoking Cessation handout, if applicable  - Encourage broncho-pulmonary hygiene including cough, deep jana

## 2021-12-07 NOTE — PROGRESS NOTES
[place] [facility] - [facility]    Progress Note    Scottiefarzad Hernández Patient Status:  Inpatient    10/1/1990 MRN [id number]   Location [facility] 5SW/SE Attending [address], [address] Day # 1 PCP No primary care provider on file.        Villagomez 12/06/2021    .0 12/06/2021    CREATSERUM 0.82 12/06/2021    BUN 7 12/06/2021     12/06/2021    K 4.5 12/06/2021     12/06/2021    CO2 25.0 12/06/2021    GLU 93 12/06/2021    CA 7.8 (L) 12/06/2021    DDIMER 1.07 (H) 12/05/2021    ESRML 6 respiratory failure with hypoxia  On 6 L o2 nasal canula   Pulmonary following       Pneumonia of both lungs due to infectious organism, unspecified part of lung  Patient was tested positive for HIV in PCP's office   Concern of PCP pneumonia   Started bact

## 2021-12-07 NOTE — PAYOR COMM NOTE
--------------  CONTINUED STAY REVIEW    Payor: [employer]ICE [address] #:  [id number]  Authorization Number: [id number]    Admit date: 12/5/21  Admit time:  5:45 PM    12/5 Pulmonology Consult:  Assessment/Plan:   1. Pneumonitis–etiology is uncertain.   The pa HIV. Last partner at age 27. Has had partners that are both male and female. Denies IVDU. States smoked cocaine about 3 weeks ago. Drinks about 6 pack a day. Smokes daily and stopped 3 weeks ago.  Denies incarceration or homelessness.     # Suspected PCP pn negative     PLAN:  -  Continue on IV bactrim and prednisone. -  Continue IV ceftriaxone and azithromycin. -  Await bronchoscopy. - [address] COVID-19 isolation.   -  FU baseline HIV labs - patient DOES NOT want father or any family member to know about diagno and mediastinal lymphadenopathy, likely reactive. This can also be assessed for resolution on subsequent follow-up imaging.      Dictated by (CST): Morro Timmons MD on 12/05/2021 at 4:06 PM     Finalized by (CST): Morro Timmons MD on 12/05/2021 at 4:10 PM

## 2021-12-07 NOTE — ANESTHESIA PREPROCEDURE EVALUATION
Anesthesia PreOp Note    HPI:     Gabi Larsen is a 33year old male who presents for preoperative consultation requested by: Ziggy Horowitz MD    Date of Surgery: 12/5/2021 - 12/7/2021    Procedure(s):  BRONCHOSCOPY  Indication: Pneumonia    Relev IVPB-ADDV, 2 g, Intravenous, Q24H, Armaan Haines MD, Last Rate: 200 mL/hr at 12/07/21 1344, 2 g at 12/07/21 1344  azithromycin (ZITHROMAX) 500 mg in sodium chloride 0.9% 250 mL IVPB, 500 mg, Intravenous, Q24H, Linda Elizabeth MD, Last Rate: 250 mL/ History Narrative      Not on file    Social Determinants of Health  Financial Resource Strain: Not on file  Food Insecurity: Not on file  Transportation Needs: Not on file  Physical Activity: Not on file  Stress: Not on file  Social Connections: Not on fi or family member of the nature of the anesthetic plan, benefits, risks including possible dental damage if relevant, major complications, and any alternative forms of anesthetic management.    All of the patient's questions were answered to the best of my a

## 2021-12-08 NOTE — PROGRESS NOTES
[place] [facility] - [facility]    Progress Note    German Baker Patient Status:  Inpatient    10/1/1990 MRN [id number]   Location [facility] 5SW/SE Attending [address], [address] Day # 2 PCP No primary care provider on file.        Villagomez HGB 12.8 (L) 12/06/2021    HCT 38.0 (L) 12/06/2021    .0 12/06/2021    CREATSERUM 0.82 12/06/2021    BUN 7 12/06/2021     12/06/2021    K 4.5 12/06/2021     12/06/2021    CO2 25.0 12/06/2021    GLU 93 12/06/2021    CA 7.8 (L) 12/06/20

## 2021-12-08 NOTE — PLAN OF CARE
Problem: Patient Centered Care  Goal: Patient preferences are identified and integrated in the patient's plan of care  Description: Interventions:  - What would you like us to know as we care for you?   - Provide timely, complete, and accurate informatio Problem: SAFETY ADULT - FALL  Goal: Free from fall injury  Description: INTERVENTIONS:  - Assess pt frequently for physical needs  - Identify cognitive and physical deficits and behaviors that affect risk of falls.   - [place] fall precautions as indica locked, call light left within reach.

## 2021-12-08 NOTE — PROCEDURES
[place] [facility] - [facility]  Procedure Note  21    Mynor Vasquez Patient Status:  Inpatient    10/1/1990 MRN [id number]   Location [facility] 5SW/SE Attending [address], [address] Day # 2 PCP No primary care provider on file.

## 2021-12-08 NOTE — PROGRESS NOTES
[facility] [place]    Progress Note      Assessment and Plan:   1. Pneumocystis pneumonia –bronchoalveolar lavage demonstrates typical organisms.     Recommendations:  1. Bactrim  2.   Ceftriaxone and azithromycin–we will leave to ID whether or

## 2021-12-08 NOTE — PROGRESS NOTES
INFECTIOUS DISEASE PROGRESS NOTE  [facility] PROGRESS [facility]    Johnathan Prado Patient Status:  Inpatient    10/1/1990 MRN [id number]   Location [facility] 5SW/SE Attending Melita Alvarez [facility] Day # 3 with no PE but extensive bilateral upper and lower groundglass opacity concerning for severe pulmonary edema or multifocal pneumonia. LDH elevated 602. HIV test positive and awaiting confirmation. Also with lymphopenia.   Urine Legionella and strep pneum

## 2021-12-09 NOTE — PROGRESS NOTES
[place] [facility] - [facility]    Progress Note    German Baker Patient Status:  Inpatient    10/1/1990 MRN [id number]   Location [facility] 5SW/SE Attending [address], [address] Day # 3 PCP No primary care provider on file.        Villagomez 12/06/2021    HGB 12.8 (L) 12/06/2021    HCT 38.0 (L) 12/06/2021    .0 12/06/2021    CREATSERUM 0.82 12/06/2021    BUN 7 12/06/2021     12/06/2021    K 4.5 12/06/2021     12/06/2021    CO2 25.0 12/06/2021    GLU 93 12/06/2021    CA 7.8 (

## 2021-12-09 NOTE — PROGRESS NOTES
INFECTIOUS DISEASE PROGRESS NOTE  [facility]    Fredrick Notice Patient Status:  Inpatient    10/1/1990 MRN [id number]   Location [facility] 5SW/SE Attending Chandrakant [facility] Day # 4 panel negative. CT chest PE with no PE but extensive bilateral upper and lower groundglass opacity concerning for severe pulmonary edema or multifocal pneumonia. LDH elevated 602. HIV test positive and awaiting confirmation. Also with lymphopenia.   Lynn Waldrop

## 2021-12-09 NOTE — PAYOR COMM NOTE
--------------  CONTINUED STAY REVIEW    Payor: [employer]ICE [address] #:  [id number]  Authorization Number: [id number]    Admit date: 12/5/21  Admit time:  5:45 PM    12/9    SATS DROPPED TO 82-85 TODAY      Assessment and Plan:   1.     pneumonia –bronchoalv Sandi Abreu RN    12/8/2021 1334 New Bag 400 mg of trimethoprim Intravenous Bill Oneil RN          Vitals (last day)     Date/Time Temp Pulse Resp BP SpO2 Weight O2 Device O2 Flow Rate (L/min) [facility]    12/09/21 1004 98.1 °F (36.7 °C) 10

## 2021-12-09 NOTE — PLAN OF CARE
Problem: Patient Centered Care  Goal: Patient preferences are identified and integrated in the patient's plan of care  Description: Interventions:  - What would you like us to know as we care for you?  From home with father  - Provide timely, complete, an social influences on pain and pain management  - Manage/alleviate anxiety  - Utilize distraction and/or relaxation techniques  - Monitor for opioid side effects  - Notify MD/LIP if interventions unsuccessful or patient reports new pain  - Anticipate increa their own health  - Refer to Case Management Department for coordinating discharge planning if the patient needs post-hospital services based on physician/LIP order or complex needs related to functional status, cognitive ability or social support system

## 2021-12-09 NOTE — PLAN OF CARE
Problem: Patient Centered Care  Goal: Patient preferences are identified and integrated in the patient's plan of care  Description: Interventions:  - What would you like us to know as we care for you?  From home with father  - Provide timely, complete, an social influences on pain and pain management  - Manage/alleviate anxiety  - Utilize distraction and/or relaxation techniques  - Monitor for opioid side effects  - Notify MD/LIP if interventions unsuccessful or patient reports new pain  - Anticipate increa and/or skin breakdown  - Initiate interventions, skin care algorithm/standards of care as needed  Outcome: Progressing     Problem: DISCHARGE PLANNING  Goal: Discharge to home or other facility with appropriate resources  Description: INTERVENTIONS:  - Morro Bal

## 2021-12-10 NOTE — BH PROGRESS NOTE
12/10 1045: Attempted to meet with patient. Patient feeling anxious and expressing concern about his heart rate, despite previous reassurances from the RN.  Spoke with pt about attempting to breathe slower and as deep as possible with his current SOB d/t pn

## 2021-12-10 NOTE — PROGRESS NOTES
[facility]    Progress Note      Assessment and Plan:   1. Pneumocystis pneumonia –bronchoalveolar lavage demonstrates typical organisms. Interval worsening of x-ray.     Recommendations:  1. Bactrim  2.   Zosyn and azithromycin–continue

## 2021-12-10 NOTE — PLAN OF CARE
Received the pt from 5th floor due to low O2 sats on HF 15L. Pt placed on Vapotherm 30L 75%. Pt stated feels better. Now well resting and sleeping. VSS . Call light within reach.

## 2021-12-10 NOTE — PROGRESS NOTES
[place] [facility] - [facility]    Progress Note    Kaushal Augustin Patient Status:  Inpatient    10/1/1990 MRN [id number]   Location [facility] 5SW/SE Attending [address], [address] Day # 5 PCP No primary care provider on file.        Villagomez 200 mg, 200 mg, Oral, Q6H PRN          Results:     Lab Results   Component Value Date    WBC 6.6 12/10/2021    HGB 13.1 12/10/2021    HCT 38.9 (L) 12/10/2021    .0 12/10/2021    CREATSERUM 0.68 (L) 12/10/2021    BUN 8 12/10/2021     12/10/202

## 2021-12-10 NOTE — PROGRESS NOTES
[place] [facility] - [facility]    Progress Note    German Baker Patient Status:  Inpatient    10/1/1990 MRN [id number]   Location [facility] 2W/SW Attending [address], [address] Day # 5 PCP No primary care provider on file.      Sub 12/05/2021       XR CHEST AP PORTABLE  (CPT=71045)    Result Date: 12/10/2021  CONCLUSION:  1. Borderline cardiomegaly. 2. Extensive bilateral multifocal pneumonitis versus atypical viral pneumonia with slight progression 3.  Follow-up to interval resolutio
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
## 2021-12-10 NOTE — PROGRESS NOTES
[place] [facility] - [facility]    Progress Note    Scottiefarzad Hernández Patient Status:  Inpatient    10/1/1990 MRN [id number]   Location [facility] 5SW/SE Attending [address], [address] Day # 4 PCP No primary care provider on file.        Villagomez Value Date    WBC 5.3 12/06/2021    HGB 12.8 (L) 12/06/2021    HCT 38.0 (L) 12/06/2021    .0 12/06/2021    CREATSERUM 0.82 12/06/2021    BUN 7 12/06/2021     12/06/2021    K 4.5 12/06/2021     12/06/2021    CO2 25.0 12/06/2021    GLU 93

## 2021-12-10 NOTE — PLAN OF CARE
Problem: Patient Centered Care  Goal: Patient preferences are identified and integrated in the patient's plan of care  Description: Interventions:  - What would you like us to know as we care for you?  From home with father  - Provide timely, complete, an social influences on pain and pain management  - Manage/alleviate anxiety  - Utilize distraction and/or relaxation techniques  - Monitor for opioid side effects  - Notify MD/LIP if interventions unsuccessful or patient reports new pain  - Anticipate increa and/or skin breakdown  - Initiate interventions, skin care algorithm/standards of care as needed  Outcome: Progressing     Problem: DISCHARGE PLANNING  Goal: Discharge to home or other facility with appropriate resources  Description: INTERVENTIONS:  - Marilyn Hdez

## 2021-12-10 NOTE — PROGRESS NOTES
INFECTIOUS DISEASE PROGRESS NOTE  [facility]    Kaushal Augustin Patient Status:  Inpatient    10/1/1990 MRN [id number]   Location [facility] 5SW/SE Attending Portillo Miller [facility] Day # 5 Blood cultures are pending. Rapid COVID-19 PCR and respiratory pathogen panel negative. CT chest PE with no PE but extensive bilateral upper and lower groundglass opacity concerning for severe pulmonary edema or multifocal pneumonia. LDH elevated 602.

## 2021-12-11 NOTE — PLAN OF CARE
Problem: Patient Centered Care  Goal: Patient preferences are identified and integrated in the patient's plan of care  Description: Interventions:  - What would you like us to know as we care for you?  From home with father  - Provide timely, complete, an strengthening/mobility  - Encourage toileting schedule  Outcome: Progressing   Patient calls for help in and out of bed, call light within reach, bed in lowest position, all personal items are within reach. Patient educated on safety physical limitations.

## 2021-12-11 NOTE — PLAN OF CARE
Pt remains on HF/HH O2, required increase in FIO2 & flow rate for mild desaturation, breath sounds essentially clear, am w/productive cough of clear sputum, Heraclio DM given by other RN for discomfort w/cough, good results.  Ibuprofen for mid-low back pain, goo patient reports new pain  - Anticipate increased pain with activity and pre-medicate as appropriate  Outcome: Progressing     Problem: SAFETY ADULT - FALL  Goal: Free from fall injury  Description: INTERVENTIONS:  - Assess pt frequently for physical needs vomiting  Description: INTERVENTIONS:  - Maintain adequate hydration with IV or PO as ordered and tolerated  - Nasogastric tube to low intermittent suction as ordered  - Evaluate effectiveness of ordered antiemetic medications  - Provide nonpharmacologic c

## 2021-12-11 NOTE — PROGRESS NOTES
[place] [facility]    Progress Note    Judy Escalante Patient Status:  Inpatient    10/1/1990 MRN [id number]   Location [facility] 5SW/SE Attending [address], [address] [facility] Day # 6 PCP No primary care provider on file.        Villagomez 12/11/2021    HCT 39.5 12/11/2021    .0 12/11/2021    CREATSERUM 0.68 (L) 12/11/2021    BUN 9 12/11/2021     (L) 12/11/2021    K 4.8 12/11/2021     12/11/2021    CO2 25.0 12/11/2021     (H) 12/11/2021    CA 8.0 (L) 12/11/2021    D

## 2021-12-11 NOTE — PROGRESS NOTES
[place] [facility] - [facility]    Progress Note    Lynn Acevedo Patient Status:  Inpatient    10/1/1990 MRN [id number]   Location [facility] 2W/SW Attending [address], [address] Day # 6 PCP No primary care provider on file.      Sub 1. Borderline cardiomegaly. 2. Extensive bilateral multifocal pneumonitis versus atypical viral pneumonia with slight progression 3.  Follow-up to interval resolution    Dictated by (CST): Valentina Thomson MD on 12/10/2021 at 8:07 AM     Finalized by (CS

## 2021-12-12 PROBLEM — F41.3 OTHER MIXED ANXIETY DISORDERS: Status: ACTIVE | Noted: 2021-01-01

## 2021-12-12 PROBLEM — F10.21 ALCOHOL USE DISORDER, MODERATE, IN EARLY REMISSION (HCC): Status: ACTIVE | Noted: 2021-01-01

## 2021-12-12 NOTE — PHYSICAL THERAPY NOTE
Patient is on high flow oxygen @ 90% FiO2 and 40L- Rehab cutoff for FiO2 typically 80%- OT asked RN to contact either PT or OT if patient weans below 80% today and maintains saturations >88% and we can re-attempt. Otherwise we will continue to follow.

## 2021-12-12 NOTE — OCCUPATIONAL THERAPY NOTE
Patient is on high flow oxygen @ 90% FiO2 and 40L- Rehab cutoff for FiO2 typically 80%- I asked RN to contact myself or PT if patient weans below 80% today and maintains saturations >88% and we can re-attempt. Otherwise we will continue to follow.      Moira Morgan

## 2021-12-12 NOTE — PROGRESS NOTES
[place] [facility] - [facility]    Progress Note    Blas Marshalejandra Patient Status:  Inpatient    10/1/1990 MRN [id number]   Location [facility] 2W/SW Attending [address], [address] Day # 7 PCP No primary care provider on file.      Sub found.        Assessment & Plan:     1- PCP pneumonia   Bactrim and steroid   Zosyn /azithromycin for possible other bacterial infection   ID following      2- AIDS   On HIV therapy / ID following      3- acute respiratory failure with hypoxia   Secondary

## 2021-12-12 NOTE — PLAN OF CARE
No acute events over night, patient involved and agreeable to plan of care, no questions at this time. Will continue to monitor.       Problem: Patient Centered Care  Goal: Patient preferences are identified and integrated in the patient's plan of care  Yossi pain and evaluate response  - Implement non-pharmacological measures as appropriate and evaluate response  - Consider cultural and social influences on pain and pain management  - Manage/alleviate anxiety  - Utilize distraction and/or relaxation techniques Assess and document risk factors for pressure ulcer development  - Assess and document skin integrity  - Monitor for areas of redness and/or skin breakdown  - Initiate interventions, skin care algorithm/standards of care as needed  Outcome: Progressing

## 2021-12-12 NOTE — CONSULTS
[place] [facility]      Report of Psychiatric Consultation    Reji Gan Patient Status:  Inpatient    10/1/1990 MRN [id number]   Location [facility] 2W/SW Attending [address], [address] Day # 7 PCP No primary care prov never used smokeless tobacco. He reports previous alcohol use. He reports previous drug use. PAST PSYCHIATRIC HISTORY: As in HPI; he has never received any mental health treatment.       SUBSTANCE ABUSE HISTORY: Regular alcohol use at least every other wearing nasal cannula. He was awake and alert with no abnormal motor activity, and appeared fairly comfortable. He did not appear overtly anxious or depressed and rated his current anxiety as \"2/10\" (10 being the worst).   He denied any suicidal ideatio

## 2021-12-12 NOTE — PLAN OF CARE
Patient alert and oriented x4. Currently on vapotherm 40L 80%, saturating over 90%. Plan for a chest xray. Started on TUMS and Protonix. Seen by psych today, plan to start medication. Educated to use the call light, within reach.      Problem: Patient assistance  - Assess pain using appropriate pain scale  - Administer analgesics based on type and severity of pain and evaluate response  - Implement non-pharmacological measures as appropriate and evaluate response  - Consider cultural and social influenc Problem: SKIN/TISSUE INTEGRITY - ADULT  Goal: Skin integrity remains intact  Description: INTERVENTIONS  - Assess and document risk factors for pressure ulcer development  - Assess and document skin integrity  - Monitor for areas of redness and/or skin b

## 2021-12-13 NOTE — PROGRESS NOTES
[facility]ECTIOUS DISEASE PROGRESS NOTE  [facility]    Yvon Flores Patient Status:  Inpatient    10/1/1990 MRN [id number]   Location [facility] 5SW/SE Attending Srikanth Escudero [facility] Day # 8 requiring up to 7 L nasal cannula. Started on ceftriaxone and azithromycin. Procalcitonin normal.  Blood cultures are pending. Rapid COVID-19 PCR and respiratory pathogen panel negative.   CT chest PE with no PE but extensive bilateral upper and lower gr

## 2021-12-13 NOTE — PLAN OF CARE
Patient remained Vapotherm of 6L, 40L FiO2/nasal cannula and saturating at 88-92% resting in bed. Pt desatures at low 80's on ambulation to the bathroom. Assisted by staff x 2. Pt complaining of slight heartburn but refused Tums at this time.  No nausea, vo INTERVENTIONS:  - Encourage pt to monitor pain and request assistance  - Assess pain using appropriate pain scale  - Administer analgesics based on type and severity of pain and evaluate response  - Implement non-pharmacological measures as appropriate and for signs/symptoms of CO2 retention  Outcome: Progressing     Problem: SKIN/TISSUE INTEGRITY - ADULT  Goal: Skin integrity remains intact  Description: INTERVENTIONS  - Assess and document risk factors for pressure ulcer development  - Assess and document

## 2021-12-13 NOTE — PLAN OF CARE
Alert oriented x4, anxious at times about current condition, emotional support provided throughout day. Remains on vapotherm 80% fiO2 40L. Shortness of breath/ desat's with exertion, seen by physical therapy today who recommend acute rehab.  Oxygen desatura specific interventions  Outcome: Progressing     Problem: PAIN - ADULT  Goal: Verbalizes/displays adequate comfort level or patient's stated pain goal  Description: INTERVENTIONS:  - Encourage pt to monitor pain and request assistance  - Assess pain using suctioning and perform as needed  - Assess and instruct to report SOB or any respiratory difficulty  - Respiratory Therapy support as indicated  - Manage/alleviate anxiety  - Monitor for signs/symptoms of CO2 retention  Outcome: Progressing-remains on vapo nonpharmacologic comfort measures as appropriate  - Advance diet as tolerated, if ordered  - Obtain nutritional consult as needed  - Evaluate fluid balance  Outcome: Progressing-no nausea vomitting or diarrhea, tolerating diet.

## 2021-12-13 NOTE — PROGRESS NOTES
[place] [facility] - [facility]    Progress Note    Manuel Cure Patient Status:  Inpatient    10/1/1990 MRN [id number]   Location [facility] 5SW/SE Attending [address], [address] Day # 7 PCP No primary care provider on file.        Villagomez PRN  ibuprofen (MOTRIN) tab 200 mg, 200 mg, Oral, Q6H PRN          Results:     Lab Results   Component Value Date    WBC 6.5 12/11/2021    HGB 13.2 12/11/2021    HCT 39.5 12/11/2021    .0 12/11/2021    CREATSERUM 0.68 (L) 12/11/2021    BUN 9 12/11/

## 2021-12-13 NOTE — PAYOR COMM NOTE
--------------  CONTINUED STAY REVIEW    Payor: [employer] [address] #:  [id number]  Authorization Number: [id number]    Admit date: 12/5/21  Admit time:  5:45 PM    12/9  Pulmonary Progress Note:    Assessment and Plan:   1.    Pneumocystis pneumonia –bronc On IV bactrim and steroid   Continue zosyn and azithromycin   S/p  Bronchoscopy      HIV positive   atient was tested positive for HIV in PCP's office   Started biktarvy     Depression   Psych f/u noted      DVT prophylaxis   On Lovenox     12/12 Pulmona  Bactrim  2. Zosyn and azithromycin–continued per ID.  3.  Steroids  4. We will follow clinically     2.  DVT prophylaxis–subcutaneous heparin     3.   History of HIV positivity–although patient had denied per my interview, he now admits to having been re Action Dose Route User    12/13/2021 1116 Given 40 mg Oral Vita Florez, RN      Piperacillin Sod-Tazobactam So (ZOSYN) 3.375 g in dextrose 5% 100 mL IVPB-MBP     Date Action Dose Route User    12/13/2021 0821 New Bag 3.375 g Intravenous Sebastien Coughlin 12/12/21 2040 — — — — — — High flow/High humidity 40 L/min TW    12/12/21 2000 97.3 °F (36.3 °C) 83 24 120/82 94 % — High flow/High humidity 40 L/min AM    12/12/21 1800 — 89 21 113/73 91 % — High flow/High humidity 40 L/min ET    12/12/21 1600 — 109 20 12

## 2021-12-13 NOTE — PROGRESS NOTES
[facility]    Progress Note      Assessment and Plan:   1. Pneumocystis pneumonia –bronchoalveolar lavage demonstrates typical organisms. Chest x-ray just shows very impressive bilateral infiltrates.  Requiring high flows oxygen.     Wilmer

## 2021-12-13 NOTE — CONSULTS
Physician Medicine & Rehabilitation Consult Note         SUBJECTIVE:    Chief Complaint: To assess rehabilitation needs following Mobility and ADL dysfunction s/p Hypoxia as per request of Dr. Austyn Hennessy     History of Present Illness: Review of the r Gait: Deferred given pt's oxygen saturations and activity tolerance.       *Vitals at rest: SpO2 on 40L 80% FiO2 Vapotherm   90% and HR  75bpm*  *Vitals with activity: SpO2 on 40L 80% FiO2 Vapotherm    82%-83% and  bpm*- required 5-6 minutes to davonte throat  Cardiovascular: Denies chest pain, palpitations  Respiratory: Denies, cough hemoptysis +SOB  Gastrointestinal: Denies nausea vomiting abdominal pain diarrhea constipation bright red blood per rectum, melena +dysphagia  Genitourinary: Denies dysuria 4.8 3.8    105 101   CO2 24.0 25.0 28.0     No results found for: PT, INR      REHAB DIAGNOSES:  Impaired mobility and self-care secondary to Hypoxia [R09.02]  Pneumonia of both lungs due to infectious organism, unspecified part of lung [J18.9].     P hours per day of skilled therapy, at least 5 days a week, in one or more of the functional domains documented above. 24 hour rehabilitation nursing care also beneficial for medication management, pressure sore prevention, bladder and bowel management.

## 2021-12-13 NOTE — OCCUPATIONAL THERAPY NOTE
OCCUPATIONAL THERAPY EVALUATION - INPATIENT     Room Number: 204/204-A  Evaluation Date: 12/13/2021  Type of Evaluation: Initial       Physician Order: IP Consult to Occupational Therapy  Reason for Therapy: ADL/IADL Dysfunction and Discharge Planning    O '6 clicks' Inpatient Daily Activity Short Form. Research supports that patients with this level of impairment may benefit from home with [facility], however, patient is essentially home on his own as he reports his father is not able to assist at d/c.  Patient is 89        BP: 124/86  BP Location: Right arm  BP Method: Automatic  Patient Position: Sitting    O2 SATURATIONS  Oxygen Therapy  SPO2% on Oxygen at Rest: 92  At rest oxygen flow (liters per minute):  (40L 80% Fio2 Airvo)  SPO2% Ambulation on Oxygen: 84

## 2021-12-13 NOTE — PHYSICAL THERAPY NOTE
PHYSICAL THERAPY EVALUATION - INPATIENT     Room Number: 204/204-A  Evaluation Date: 12/13/2021  Type of Evaluation: Initial   Physician Order: PT Eval and Treat    Presenting Problem: Hypoxia  Co-Morbidities : HIV positive  Reason for Therapy: Mobility D clicks' Inpatient Basic Mobility Short Form. Research supports that patients with this level of impairment may benefit from Acute Rehab. Patient will benefit from continued IP PT services to address these deficits in preparation for discharge.     [address] [facility] strength are within functional limits  Lower extremity ROM is within functional limits  Lower extremity strength demonstrating mild functional deficits through flaquita LEs.      BALANCE  Static Sitting: Fair  Dynamic Sitting: Fair -  Static Standing: Poor +  Dy chair;Call light within reach; Needs met;RN aware of session/findings; All patient questions and concerns addressed    CURRENT GOALS    Goals to be met by: 12/27/21  Patient Goal Patient's self-stated goal is: get back to normal   Goal #1 Patient is able to

## 2021-12-14 NOTE — PLAN OF CARE
Patient vitals remain stable. PRN cough medicine given. Patient denies pain or shortness of breath. Will continue to monitor.    Problem: Patient Centered Care  Goal: Patient preferences are identified and integrated in the patient's plan of care  [facility] and evaluate response  - Implement non-pharmacological measures as appropriate and evaluate response  - Consider cultural and social influences on pain and pain management  - Manage/alleviate anxiety  - Utilize distraction and/or relaxation techniques  - M and document risk factors for pressure ulcer development  - Assess and document skin integrity  - Monitor for areas of redness and/or skin breakdown  - Initiate interventions, skin care algorithm/standards of care as needed  Outcome: Progressing     Proble

## 2021-12-14 NOTE — PROGRESS NOTES
INFECTIOUS DISEASE PROGRESS NOTE  [facility]    Horacio Chavez Patient Status:  Inpatient    10/1/1990 MRN [id number]   Location [facility] 5SW/SE Attending Halina Moctezuma [facility] Day # 9 During this admission has been febrile up to 103.3 with tachycardia and hypoxia requiring up to 7 L nasal cannula. Started on ceftriaxone and azithromycin. Procalcitonin normal.  Blood cultures are pending.   Rapid COVID-19 PCR and respiratory pathogen pa

## 2021-12-14 NOTE — PROGRESS NOTES
[place] [facility] [place] [facility]    Progress Note    Gerald [facility] Patient Status:  Inpatient    10/1/1990 MRN [id number]   Location [facility] 5SW/SE Attending [address], [address] Day # 8 PCP No primary care provider on file.        Villagomez (TESSALON) cap 100 mg, 100 mg, Oral, TID PRN  ibuprofen (MOTRIN) tab 200 mg, 200 mg, Oral, Q6H PRN          Results:     Lab Results   Component Value Date    WBC 7.8 12/13/2021    HGB 13.6 12/13/2021    HCT 40.5 12/13/2021    .0 12/13/2021    CREAT

## 2021-12-14 NOTE — PROGRESS NOTES
[facility] - [facility]    Progress Note      Assessment and Plan:   1. Pneumocystis pneumonia –bronchoalveolar lavage demonstrates typical organisms. Chest x-ray just shows very impressive bilateral infiltrates but is otherwise unchanged. Rodrick Salamanca  Requiring akshat Ogden MD  Medical Director, Critical Care, [address] [place] [address]  Medical Director, [facility]  Pager: [phone]

## 2021-12-14 NOTE — OCCUPATIONAL THERAPY NOTE
Pt SPO2 85-88% on 40L/m 80% FiO2. Pt educated on pursed lipped breathing. Patient appears to be very anxious and fixated on monitors which may contribute to feelings of anxiety and breathing.  Assisted patient with boost to [facility] and reports more comforta

## 2021-12-14 NOTE — OCCUPATIONAL THERAPY NOTE
Check back on patient in the afternoon- he had just returned back to bed with staff; spent >20 minutes with patient providing empathetic listening and calming/positive presence.  Patient educated on bed level exercises, importance of activity pacing, and po

## 2021-12-14 NOTE — PHYSICAL THERAPY NOTE
Chart reviewed, attempted to see pt for PT tx. Pt received resting in bed, appeared uncomfortable with mild SOB. Pt SPO2 85-88% on 40L/m 80% FiO2. Pt educated on pursed lipped breathing. Boosted to [facility] at MAX A x 2 with HOB >30 deg to improve breathing.  SP

## 2021-12-15 NOTE — PROGRESS NOTES
[place] [facility] - [facility]    Progress Note    Scottie Hernández Patient Status:  Inpatient    10/1/1990 MRN [id number]   Location [facility] 5SW/SE Attending [address], [address] Day # 9 PCP No primary care provider on file.        Villagomez 100-10 MG/5ML syrup 5 mL, 5 mL, Oral, Q6H PRN  benzonatate (TESSALON) cap 100 mg, 100 mg, Oral, TID PRN  ibuprofen (MOTRIN) tab 200 mg, 200 mg, Oral, Q6H PRN          Results:     Lab Results   Component Value Date    WBC 8.1 12/14/2021    HGB 14.3 12/14/2

## 2021-12-15 NOTE — PLAN OF CARE
Pt is A&O x 3, calm pleasant and agreeable. Pt was administered pain meds Pt had an anxiety attack in the morning and was given ativan x1 and 2 mg xanax prn. The patient has been able to calm down and rest. However, The heart rate is still tachycardic.  Pt goal  Description: INTERVENTIONS:  - Encourage pt to monitor pain and request assistance  - Assess pain using appropriate pain scale  - Administer analgesics based on type and severity of pain and evaluate response  - Implement non-pharmacological measures transportation as appropriate  - Identify discharge learning needs (meds, wound care, etc)  - Arrange for interpreters to assist at discharge as needed  - Consider post-discharge preferences of patient/family/discharge partner  - Complete POLST form as lashaun

## 2021-12-15 NOTE — PROGRESS NOTES
[facility]    Progress Note    Judy Escalante Patient Status:  Inpatient    10/1/1990 MRN [id number]   Location [facility] 5SW/SE Attending [address], [address] Day # 10 PCP No primary care provider on file.        Delilah Rico 40 mg, 40 mg, Subcutaneous, Nightly  acetaminophen (TYLENOL) tab 650 mg, 650 mg, Oral, Q6H PRN  guaiFENesin-codeine (ROBITUSSIN AC) 100-10 MG/5ML syrup 5 mL, 5 mL, Oral, Q6H PRN  benzonatate (TESSALON) cap 100 mg, 100 mg, Oral, TID PRN  ibuprofen (MOTRIN)

## 2021-12-15 NOTE — PAYOR COMM NOTE
--------------  CONTINUED STAY REVIEW    Payor: Michelle Stewart #:  [id number]  Authorization Number: [id number]    Admit date: 12/5/21  Admit time:  5:45 PM        12/15    REMAINS ON VAPOTHERM       Assessment and Plan:   1.    Pneumocystis pneumonia mL IVPB     Date Action Dose Route User    12/15/2021 1055 New Bag 400 mg Intravenous Mónica Raymond RN    12/14/2021 6664 New Bag 400 mg Intravenous Cristina Jett RN      guaiFENesin-codeine (ROBITUSSIN AC) 100-10 MG/5ML syrup 5 mL     Estrada New Bag 400 mg of trimethoprim Intravenous Cristina Escobar, RN          Vitals (last day)     Date/Time Temp Pulse Resp BP SpO2 Weight O2 Device O2 Flow Rate (L/min) Who    12/15/21 1400 — 83 27 123/82 96 % — High flow nasal cannula 35 L/min AM    1 — — —     12/14/21 0200 — 89 29 113/69 92 % — High flow/High humidity 40 L/min     12/14/21 0050 — — — — 93 % — High flow/High humidity 40 L/min     12/14/21 0000 97.3 °F (36.3 °C) 88 30 113/77 94 % — High flow/High humidity 40 L/min         CIWA S

## 2021-12-15 NOTE — OCCUPATIONAL THERAPY NOTE
Patient required increased FiO2- desaturating at rest and tachycardic- Patient is not stable for OOB activities at this time. Will continue to follow.      [facility], OTR/L ext [phone]

## 2021-12-15 NOTE — PROGRESS NOTES
12/15/21 1547   Oxygen Utilization   O2 Device High flow/High humidity   O2 Flow Rate (L/min) 35 L/min   FiO2 (%) 90 %   Humidity High   [place] Filled     Patient received on 35L 90%, all vital signs stable.  Patient sating in low 90's and unable to w

## 2021-12-15 NOTE — PROGRESS NOTES
INFECTIOUS DISEASE PROGRESS NOTE  [facility]    Jordis Show Patient Status:  Inpatient    10/1/1990 MRN [id number]   Location [facility] 5SW/SE Attending Cheryl Morocho [facility] Day # 1 week with previous strep and COVID-19 test negative. Fully vaccinated for COVID-19. Denies hemoptysis, chest pain, IV drug use, MSM. During this admission has been febrile up to 103.3 with tachycardia and hypoxia requiring up to 7 L nasal cannula.   Star

## 2021-12-15 NOTE — PHYSICAL THERAPY NOTE
Chart reviewed. Pt with tachycardia to 127 bpm, high RR in 40s, increased O2 needs to 35L/m with 90% FiO2. Not medically stable for mobility at this time. Will f/u as appropriate.     Rudy Holland, DPT  Physical Therapy  [facility]

## 2021-12-15 NOTE — PROGRESS NOTES
[facility]    Progress Note      Assessment and Plan:   1. Pneumocystis pneumonia –bronchoalveolar lavage demonstrates typical organisms. Chest x-ray just shows very impressive bilateral infiltrates but is otherwise unchanged. Cristian Russell  Requiring .0 12/15/2021    CREATSERUM 0.72 12/15/2021    BUN 7 12/15/2021     12/15/2021    K 3.9 12/15/2021     12/15/2021    CO2 25.0 12/15/2021    GLU 90 12/15/2021    CA 8.3 12/15/2021     Chest x-ray–extensive bilateral infiltrates    Jose Balloon

## 2021-12-16 PROBLEM — F41.1 GENERALIZED ANXIETY DISORDER: Status: ACTIVE | Noted: 2021-01-01

## 2021-12-16 NOTE — OCCUPATIONAL THERAPY NOTE
We have attempted multiple times to work with patient, unfortunately, he has not been stable for participation 2/2 labile respiratory status and tachycardic. Will place patient on hold. He has been educated on bed level exercises, positioning, recovery dara

## 2021-12-16 NOTE — PLAN OF CARE
Problem: Patient Centered Care  Goal: Patient preferences are identified and integrated in the patient's plan of care  Description: Interventions:  - What would you like us to know as we care for you?  From home with father  - Provide timely, complete, an social influences on pain and pain management  - Manage/alleviate anxiety  - Utilize distraction and/or relaxation techniques  - Monitor for opioid side effects  - Notify MD/LIP if interventions unsuccessful or patient reports new pain  - Anticipate increa and/or skin breakdown  - Initiate interventions, skin care algorithm/standards of care as needed  Outcome: Progressing     Problem: DISCHARGE PLANNING  Goal: Discharge to home or other facility with appropriate resources  Description: INTERVENTIONS:  - Griselda Ill

## 2021-12-16 NOTE — PLAN OF CARE
Problem: Patient Centered Care  Goal: Patient preferences are identified and integrated in the patient's plan of care  Description: Interventions:  - What would you like us to know as we care for you?  From home with father  - Provide timely, complete, an strengthening/mobility  - Encourage toileting schedule  Outcome: Progressing   Patient remains fall free, call light within reach. Patient education provided on patient not getting out of bed without assistance.    Problem: SKIN/TISSUE INTEGRITY - ADULT  Go nonpharmacologic comfort measures as appropriate  - Advance diet as tolerated, if ordered  - Obtain nutritional consult as needed  - Evaluate fluid balance  Outcome: Progressing  No C/O nausea or vomiting or heart burn during this shift

## 2021-12-16 NOTE — PROGRESS NOTES
Patient saturation decreased to 79%, heart rate increase to 140's Dr. Nain Ames text paged, and came to access patient. Patient remains on Max Vapo Therm, and on Non rebreather mask. Patient saturations increased to 93% heart rate of 129.

## 2021-12-16 NOTE — PROGRESS NOTES
[facility] - [facility]    Progress Note      Assessment and Plan:   1. Pneumocystis pneumonia –bronchoalveolar lavage demonstrates typical organisms. Breathing about the same today. Chest x-ray has shown impressive infiltrates.   We will repeat in 12/16/2021     Chest x-ray–extensive bilateral infiltrates    James Julian MD  Medical Director, Critical Care, [address] [place] [address] Director, [address] [place] [address]. [phone] L  Pager: [phone]

## 2021-12-16 NOTE — CM/SW NOTE
Hospital day #11. ..acute resp. failure 2/2 PNA. Bronch done on 12/7. Pt discussed during nursing rounds. Pt likely to be intubated today d/t increased O2 demands per RN. PM&R ordered but no recommendation made d/t medical instability.  Dr. Pati Guerrier will con

## 2021-12-16 NOTE — PHYSICAL THERAPY NOTE
Chart reviewed. Pt in bed, unstable SPO2 83% on 35L/m FiO2 90% when attempted early in AM. Now stable in 90's however still with high O2 needs, high RR at 35 brpm. Unable to participate in mobility at this time.  Pt is transferring to the chair with staff a

## 2021-12-17 NOTE — PROGRESS NOTES
[facility] Dosing Service    Initial Pharmacokinetic Consult for Vancomycin AUC Dosing    German Baker is a 33year old patient who is being treated for pneumonia. Pharmacy has been asked to dose vancomycin by Dr Brittany Ogden.     Weights:  Ideal body w

## 2021-12-17 NOTE — PROGRESS NOTES
Twila Guillen is a 33year old  single male with recent diagnosis of HIV who presented to the hospital with fever and shortness of breath. Patient has been demonstrating intense anxiety.     I spent a total of over 35-minute minutes with the nazario quittin.1      Smokeless tobacco: Never Used    Vaping Use      Vaping Use: Never used    Alcohol use: Not Currently    Drug use: Not Currently         Medications (Active prior to today's visit):  mirtazapine (REMERON) tab 7.5 mg, 7.5 mg, Oral, Nightl CA 8.7 12/16/2021    ALB 2.0 (L) 12/13/2021    ALKPHO 79 12/13/2021    TP 6.7 12/13/2021    AST 33 12/13/2021    ALT 63 (H) 12/13/2021    DDIMER 1.07 (H) 12/05/2021    ESRML 61 (H) 12/05/2021    CRP 10.10 (H) 12/05/2021    TROP <0.045 12/05/2021         Im disorders  Alcohol use disorder, moderate, in early remission [facility])      The patient with history of anxiety, alcohol abuse and recent diagnosis with HIV who has been demonstrating increase in anxiety, worry and depressed mood.   Patient denies any hopeless (7)      Cmv Quant Dna Pcr (Plasma)      Basic Metabolic Panel (8)      CBC With Differential With Platelet      Basic Metabolic Panel (8)      CBC With Differential With Platelet      Basic Metabolic Panel (8)      CBC, Platelet;  No Differential      CBC

## 2021-12-17 NOTE — PROGRESS NOTES
[place] [facility]    Progress Note    Brinda Gutierrez Patient Status:  Inpatient    10/1/1990 MRN [id number]   Location [facility] 2W/SW Attending Varghese Chavez, [address] [facility] Se Day # 6 PCP No primary care provider on file.      Subjec pneumocystitis  on zosyn , zithro and bactrim  GERD  HIV on meds  Depression            Parker Fordsamantha Deras MD, Kun Geronimo MD  12/16/2021

## 2021-12-17 NOTE — SIGNIFICANT EVENT
ICU nocturnist    Called by RN to evaluate patient in room 2 4.     Patient hypoxic breathing in the 50s on Vapotherm and nonrebreather saturating in the 60s, patient initially placed on BiPAP with improvement in O2 sats into the 80s however began desaturat

## 2021-12-17 NOTE — PROGRESS NOTES
[facility] Pharmacy Note: Antimicrobial Weight Based Dose Adjustment for: meropenem (MERREM)    Kilo Jolley is a 33year old patient who has been prescribed meropenem (MERREM) 1gm every 8 hours.     Estimated Creatinine Clearance: 122.2 mL/min (based on SCr

## 2021-12-17 NOTE — PROGRESS NOTES
[place] [facility] - [facility]    Progress Note    Alice [place] Patient Status:  Inpatient    10/1/1990 MRN [id number]   Location [facility] 2W/SW Attending Shannon Colmenares, [address] [facility] Se Day # 12 PCP No primary care provider on file.      Subjec syndrome. Continued follow-up imaging recommended to ensure resolution. Endotracheal and nasogastric tubes in place. Preliminary report was submitted by the [facility] teleradiologist and there are no significant discrepancies.   elm-remote  Dictated by GEM bactrim  GERD  HIV on meds  Depression  Prognosis poor

## 2021-12-17 NOTE — PROGRESS NOTES
INFECTIOUS DISEASE PROGRESS NOTE  [facility]    Twila [place] Patient Status:  Inpatient    10/1/1990 MRN [id number]   Location [facility] 5SW/SE Attending Aiden [facility] Day # 1 been febrile up to 103.3 with tachycardia and hypoxia requiring up to 7 L nasal cannula. Started on ceftriaxone and azithromycin. Procalcitonin normal.  Blood cultures are pending. Rapid COVID-19 PCR and respiratory pathogen panel negative.   CT chest PE

## 2021-12-17 NOTE — PLAN OF CARE
Problem: Patient Centered Care  Goal: Patient preferences are identified and integrated in the patient's plan of care  Description: Interventions:  - What would you like us to know as we care for you?  From home with father  - Provide timely, complete, an social influences on pain and pain management  - Manage/alleviate anxiety  - Utilize distraction and/or relaxation techniques  - Monitor for opioid side effects  - Notify MD/LIP if interventions unsuccessful or patient reports new pain  - Anticipate increa and/or skin breakdown  - Initiate interventions, skin care algorithm/standards of care as needed  Outcome: Progressing     Problem: DISCHARGE PLANNING  Goal: Discharge to home or other facility with appropriate resources  Description: INTERVENTIONS:  - [place] physical comfort, circulation, skin condition, hydration, nutrition and elimination needs   - Reorient and redirection as needed  - Assess for the need to continue restraints  Outcome: Progressing

## 2021-12-17 NOTE — PROCEDURES
ICU nocturnist    Called by RN, x-ray findings suggesting central line ascending up into right IJ. Patient still in need of central line, pressors have been initiated.   Left subclavian area prepped, cannulated first attempt, guidewire passed, dilated, tri

## 2021-12-17 NOTE — PROCEDURES
ICU nocturnist    Patient with poor IV access, infiltrated two peripheral lines, now with drop in blood pressure, currently in need of multiple sedatives. Needs central line.   Right subclavian area prepped, cannulated first attempt, guidewire passed, dilat

## 2021-12-17 NOTE — PROGRESS NOTES
[facility]    Progress Note    Judy Escalante Patient Status:  Inpatient    10/1/1990 MRN [id number]   Location [facility] 2W/SW Attending Arturo Zeng, [address] [facility] Se Day # 12 PCP No primary care provider on file.      Subjec and lower lobe airspace opacities, unchanged since the prior exams. Findings may be secondary to severe pulmonary edema, multifocal pneumonia (either from bacterial or viral etiology) or respiratory distress syndrome.   Continued follow-up imaging recommen by (CST):  Renita Villela MD on 12/17/2021 at 7:30 AM                Assessment & Plan:     1- ARDS / acute respiratory failure with hypoxia / required intubation earlier on  12/17/21   - PCP pneumonia in newly diagnosed HIV /AIDS s/p bronchoscopy 12/7/2

## 2021-12-18 NOTE — PROGRESS NOTES
Pulmonary/Critical Care Follow Up Note    HPI:   Froilan Cutler is a 33year old male with Patient presents with:  Difficulty Breathing      PCP No primary care provider on file.   Admission Attending Maria Guadalupe Espinosa MD    Hospital Day #13    N tablet, Oral, Daily  •  sulfamethoxazole-trimethoprim (BACTRIM) 400 mg of trimethoprim in dextrose 5 % 500 mL IVPB, 5 mg/kg of trimethoprim, Intravenous, Q8H  •  influenza vaccine split quad (FLULAVAL) ages 6 months to 64 years inj 0.5ml, 0.5 mL, Intramusc TFs       Social   Pt did not want to tell family about AIDS dx  Prior to intubation he asked his father to be decsion maker  D/w all dx with father at bedside today    EOL  Discussed risks and benefits of CPR  He does not think his son would want CPR  Antonio

## 2021-12-18 NOTE — PROCEDURES
Tube Thoracostomy Procedure Note  Right Side Chest tube    Pre-operative Diagnosis: b/l PTX    Post-operative Diagnosis: Same; s/p R side chest tube.     Indications: emergent chest tube for PTX on vent and desaturations    Procedure Details:  Consent: Info

## 2021-12-18 NOTE — PROGRESS NOTES
[place] [facility] - [facility]    Progress Note    Alice [place] Patient Status:  Inpatient    10/1/1990 MRN [id number]   Location [facility] 2W/SW Attending Shannon Colmenares, [address] [facility] Se Day # 15 PCP No primary care provider on file.      Subjec MD LOGAN on 12/18/2021 at 9:41 AM          XR CHEST AP PORTABLE  (CPT=71045)    Result Date: 12/17/2021  CONCLUSION:   Extensive bilateral perihilar and bilateral upper and lower lobe airspace opacities, unchanged since the prior exams.   Findings may be secon lung opacification remains without dramatic change from exam earlier the same day. Dictated by (CST): Joanna Gomez MD on 12/17/2021 at 7:28 AM     Finalized by (CST):  Joanna Gomez MD on 12/17/2021 at 7:30 AM                Assessment & Plan:

## 2021-12-18 NOTE — PROGRESS NOTES
[facility] Dosing Service    Follow-up Pharmacokinetic Consult for Vancomycin Dosing     Harpreet Gilbert is a 33year old patient who is being treated for pneumonia. Patient is on day 2 of vancomycin and is currently receiving 1250 mg Q 12 hours.     Nataliya Cardoza

## 2021-12-18 NOTE — RESPIRATORY THERAPY NOTE
Pt remains intubated with ETT size 8.0 @ 24 at the lip on full support with the following vent settings: AC/VC 14/450/+12/100%. Pt saturating in high 80's.     @ 1700 vent mode changed to PC/AC 14/ IP 15/+12/100%/ 0.9, pt tolerating better, RT will continue

## 2021-12-18 NOTE — PLAN OF CARE
Problem: Patient Centered Care  Goal: Patient preferences are identified and integrated in the patient's plan of care  Description: Interventions:  - What would you like us to know as we care for you?  From home with father  - Provide timely, complete, an schedule  Outcome: Progressing     Problem: RESPIRATORY - ADULT  Goal: Achieves optimal ventilation and oxygenation  Description: INTERVENTIONS:  - Assess for changes in respiratory status  - Assess for changes in mentation and behavior  - Position to faci to functional status, cognitive ability or social support system  Outcome: Progressing     Problem: GASTROINTESTINAL - ADULT  Goal: Minimal or absence of nausea and vomiting  Description: INTERVENTIONS:  - Maintain adequate hydration with IV or PO as order

## 2021-12-18 NOTE — PROGRESS NOTES
[facility] Pharmacy Dosing Service:  Dosage Form Evaluation  Bettie Sena is a 33year old patient who has an NPO diet, but can receive medications via tube.     A review of the patient’s medication list finds that the following medications should be changed

## 2021-12-18 NOTE — PROGRESS NOTES
INFECTIOUS DISEASE PROGRESS NOTE  [facility]    Horacio Chavez Patient Status:  Inpatient    10/1/1990 MRN [id number]   Location [facility] 5SW/SE Attending Surjit Adorno [facility] Day # 1 Fully vaccinated for COVID-19. Denies hemoptysis, chest pain, IV drug use, MSM. During this admission has been febrile up to 103.3 with tachycardia and hypoxia requiring up to 7 L nasal cannula. Started on ceftriaxone and azithromycin.   Procalcitonin no

## 2021-12-18 NOTE — DIETARY NOTE
ADULT NUTRITION INITIAL ASSESSMENT    Pt is at high nutrition risk. Pt does not meet malnutrition criteria.       RECOMMENDATIONS TO MD:  See Nutrition Intervention for EN specifics     ADMITTING DIAGNOSIS:   Hypoxia [R09.02]  Pneumonia of both lungs due t Bictegravir-Emtricitab-Tenofov  1 tablet Oral Daily   • sulfamethoxazole-trimethoprim  5 mg/kg of trimethoprim Intravenous Q8H   • enoxaparin  40 mg Subcutaneous Nightly     LABS: reviewed. K+ elevated to 6.2, repeated and still elevated, but now 5.6.  Used Encounter      NPO    - Enteral Nutrition: Nepro at 20 ml/hr per NG tube with 2 pkts prosource . Recommend advance rate per protocol to goal rate of 40 ml/hr. Based on average 22 hour infusion time.  Goal rate provides 1665 kcal (+758 calories from propofol

## 2021-12-19 NOTE — PROCEDURES
Tube Thoracostomy Procedure Note  Left side Chest Tube    Pre-operative Diagnosis: b/l PTX    Post-operative Diagnosis: s/p L side chest tube    Indications: Emergent chest tube for PTX on vent and desaturations    Procedure Details:  Consent: Informed con

## 2021-12-19 NOTE — PROGRESS NOTES
INFECTIOUS DISEASE PROGRESS NOTE  [facility]GRESS [facility]    Tiffany Rhodes Patient Status:  Inpatient    10/1/1990 MRN [id number]   Location [facility] 5SW/SE Attending Meredith Kruse [facility] Day # 1 vaccinated for COVID-19. Denies hemoptysis, chest pain, IV drug use, MSM. During this admission has been febrile up to 103.3 with tachycardia and hypoxia requiring up to 7 L nasal cannula. Started on ceftriaxone and azithromycin.   Procalcitonin normal.

## 2021-12-19 NOTE — PROGRESS NOTES
[facility] Pharmacy Note:  Renal Adjustment for ganciclovir (Yasmin Pizano)    Gian Mac is a 33year old patient who has been prescribed ganciclovir (CYTOVENE) 5mg/kg every 12 hrs.   The estimated creatinine clearance is 56.8 mL/min (A) (based on SCr of 1.85 m

## 2021-12-20 NOTE — PROGRESS NOTES
[facility] Pharmacy Note:  Renal Adjustment for meropenem (MERREM)    Scottie Hernández is a 33year old patient who has been prescribed meropenem (MERREM) 500 mg every 8 hrs.   The estimated creatinine clearance is 27.1 mL/min (A) (based on SCr of 3.87 mg/dL (H))

## 2021-12-20 NOTE — PROGRESS NOTES
[facility] Pharmacy Note:  Renal Adjustment for ganciclovir (Milagros Burn)    Brinda Gutierrez is a 33year old patient who has been prescribed ganciclovir (CYTOVENE) 200 mg every 12 hrs.   The estimated creatinine clearance is 27.1 mL/min (A) (based on SCr of 3.87 m

## 2021-12-20 NOTE — PROGRESS NOTES
Pulmonary/Critical Care Follow Up Note    HPI:   Leroy Neri is a 33year old male with Patient presents with:  Difficulty Breathing      PCP No primary care provider on file.   Admission Attending Lino Morris MD    Hospital Day #14    N (MYCOSTATIN) suspension 500,000 Units, 5 mL, Oral, QID  •  calcium carbonate antacid (TUMS) chewable tab 1,000 mg, 1,000 mg, Oral, Q6H PRN  •  [Held by provider] Bictegravir-Emtricitab-Tenofov -25 MG TABS 1 tablet, 1 tablet, Oral, Daily  •  sulfameth strategy   Iv bactrim   Iv broad spectrum abx/anti virals   paralysis    B/L PTX  2/2 PCP  S/p R chest tube  Plan L chest tube today    FEDERICO  Mild now worsening  K high today but unexpected  EKG w/o peaked T  Plan kayexalate         AIDS   New diagnosis  CD

## 2021-12-20 NOTE — BRIEF PROCEDURE NOTE
[place] [facility] - [facility]  Procedure Note    Emogene Level Patient Status:  Inpatient    10/1/1990 MRN [id number]   Location [facility] 2W/SW Attending Neil Byers Se Day # 15 PCP No primary care provider on file.      Proce
WDL

## 2021-12-20 NOTE — PROGRESS NOTES
[place] [facility] [place] [facility]    Progress Note      Assessment and Plan:   1. 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 Now poorly oxygenating on ventilator. Bronchoalveolar lavage demonstrates typical organisms.   Chest x-ray abnormality     Results:     Lab Results   Component Value Date    WBC 20.2 12/20/2021    HGB 11.1 12/20/2021    HCT 33.9 12/20/2021    .0 12/20/2021    CREATSERUM 4.19 12/20/2021    BUN 40 12/20/2021     12/20/2021    K 6.4 12/20/2021    CL 9

## 2021-12-20 NOTE — PROGRESS NOTES
[facility]ECTIOUS DISEASE PROGRESS NOTE  [facility]    Shital Winklerde Patient Status:  Inpatient    10/1/1990 MRN [id number]   Location [facility] 5SW/SE Attending Fernanda Coronel [facility] Day # 1 for 1 week with previous strep and COVID-19 test negative. Fully vaccinated for COVID-19. Denies hemoptysis, chest pain, IV drug use, MSM. During this admission has been febrile up to 103.3 with tachycardia and hypoxia requiring up to 7 L nasal cannula. ART  -  Start IV voriconazole. Check Fungal panel  -  Follow fever curve, wbc.   -  Reviewed labs, micro, imaging reports.  -  Case d/w RN, Pharm, father    Margie Singh MD  [facility]  [phone]

## 2021-12-20 NOTE — PROGRESS NOTES
[facility] Pharmacy Note:  Renal Dose Adjustment for enoxaparin (LOVENOX)    Gerald Hernandez has been prescribed enoxaparin 40 mg subcutaneously every 24 hours. Estimated Creatinine Clearance: 27.1 mL/min (A) (based on SCr of 3.87 mg/dL (H)).     Calculated C

## 2021-12-20 NOTE — PLAN OF CARE
Patient remains deeply sedated on vent. Propofol at 70mcg, fentanyl at 50mcg. Pupils equal and reactive, no response to painful stimuli. + gag/cough noted.  O2 sats low throughout shift- vent settings currently A/C- R 20, Tv420, P14, 100%FiO2 with nitric ox techniques  - Monitor for opioid side effects  - Notify MD/LIP if interventions unsuccessful or patient reports new pain  - Anticipate increased pain with activity and pre-medicate as appropriate  Outcome: Progressing     Problem: SAFETY ADULT - FALL  Goal post-hospital services based on physician/LIP order or complex needs related to functional status, cognitive ability or social support system  Outcome: Progressing     Problem: GASTROINTESTINAL - ADULT  Goal: Minimal or absence of nausea and vomiting  Desc

## 2021-12-20 NOTE — PROGRESS NOTES
[place] [facility] - [facility]    Progress Note    Bettie Sena Patient Status:  Inpatient    10/1/1990 MRN [id number]   Location [facility] 2W/SW Attending Josh Alexandre, [address] [facility] Se Day # 15 PCP No primary care provider on file.      Subjec (CPT=71045)    Result Date: 12/19/2021  CONCLUSION:  1. Small bilateral apical pneumothoraces, unchanged. Bilateral chest tubes in place. 2. Extensive bilateral airspace opacification unchanged. 3. Support tubes and catheters have remained stable.     Dict shift. Left pneumothorax has not significantly changed in size and represents less than 5% of hemithorax volume. Right pneumothorax has slightly increased in size and represents approximately 5-10% of hemithorax volume.  2.  Continue ground-glass changes -------------------------- Sinus Tachycardia -Right bundle branch block.  -Horizontal axis for age. - Nonspecific T-abnormality.  ABNORMAL When compared with ECG of 12/05/2021 12:28:59 No significant changes have occurred Electronically signed on 12/19/202

## 2021-12-20 NOTE — PLAN OF CARE
Patient sedated, no response to painful stimuli. No movement to extremities at this time. Restraints discontinued.        Problem: Safety Risk - Non-Violent Restraints  Goal: Patient will remain free from self-harm  Description: INTERVENTIONS:  - Apply the

## 2021-12-20 NOTE — CONSULTS
[facility]    Report of Consultation    Kobi Mcgowan Patient Status:  Inpatient    10/1/1990 MRN [id number]   Location [facility] 2W/SW Attending Bayron Eldridge, [address] [facility]  Day # 15 PCP No primary care provider on file. Daily  Cisatracurium Besylate (PF) (NIMBEX) 200 mg in sodium chloride 0.9% 200 mL infusion, 0.5-10 mcg/kg/min (Dosing Weight), Intravenous, Continuous  CISATRACURIUM BOLUS FROM BAG 15.9 mg infusion, 0.2 mg/kg (Dosing Weight), Intravenous, PRN  artificial t years inj 0.5ml, 0.5 mL, Intramuscular, Prior to discharge  acetaminophen (TYLENOL) tab 650 mg, 650 mg, Oral, Q6H PRN  guaiFENesin-codeine (ROBITUSSIN AC) 100-10 MG/5ML syrup 5 mL, 5 mL, Oral, Q6H PRN  benzonatate (TESSALON) cap 100 mg, 100 mg, Oral, TID P 14.0   NEPRELIM 12.56* 15.08* 19.10*   WBC 13.4* 16.0* 20.2*   .0 205.0 230.0         Recent Labs   Lab 12/18/21  1245 12/19/21  0806 12/20/21  0547   * 173* 154*   BUN 15 24* 40*   CREATSERUM 1.31* 1.85* 3.87*   GFRAA 82 54* 22*   GFRNAA 71 advised pts father Dima Blank (who is now POA) about this.   He still wants to purse dialysis  Will order CRRT  And Renal US    2 - shock  On max of levaphed  On IVF and abx    3 - PCP pneumonia/CMV pneumonia/spiratory failure/bilateral pneumothorax  intubated  Ch

## 2021-12-20 NOTE — PLAN OF CARE
Pt remain vented. Bilateral restraints remain to prevent pulling out tube.    Problem: Safety Risk - Non-Violent Restraints  Goal: Patient will remain free from self-harm  Description: INTERVENTIONS:  - Apply the least restrictive restraint to prevent harm

## 2021-12-21 NOTE — PLAN OF CARE
Received Kathia Leavens sedated on ventilator, CRRT stopped due to clotted circuit. Vital signs as charted. CRRT restarted. Per Dr. Ish Dupont ok to begin pulling net 50, if tolerates then ok to pull 100. Fentanyl turned off per Dr. Bret Spurling.  Attempted awakening trial, on strengthening/mobility  - Encourage toileting schedule  Outcome: Progressing     Problem: SKIN/TISSUE INTEGRITY - ADULT  Goal: Skin integrity remains intact  Description: INTERVENTIONS  - Assess and document risk factors for pressure ulcer development  - A restrictions as appropriate  Outcome: Progressing  Goal: Hemodynamic stability and optimal renal function maintained  Description: INTERVENTIONS:  - Monitor labs and assess for signs and symptoms of volume excess or deficit  - Monitor intake, output and pa therapy as needed  - Administer medications per order  - Follow MD orders  - Diagnostics per order  - Update / inform patient on plan of care  - See additional Care Plan goals for specific interventions  Outcome: Not Progressing     Problem: RESPIRATORY - nausea and vomiting  Description: INTERVENTIONS:  - Maintain adequate hydration with IV or PO as ordered and tolerated  - Nasogastric tube to low intermittent suction as ordered  - Evaluate effectiveness of ordered antiemetic medications  - Provide nonphar

## 2021-12-21 NOTE — RESPIRATORY THERAPY NOTE
Pt on current vent settings VC+/20/420/+14/100% ; ETT 8 @ 24 cm. NO 40 ppm  Suctioned pt as needed throughout the night. Pt tolerating and saturating appropriately. No acute changes overnight, RT to continue to monitor.

## 2021-12-21 NOTE — PLAN OF CARE
Problem: SKIN/TISSUE INTEGRITY - ADULT  Goal: Skin integrity remains intact  Description: INTERVENTIONS  - Assess and document risk factors for pressure ulcer development  - Assess and document skin integrity  - Monitor for areas of redness and/or skin b bleeding, hypotension and signs of decreased cardiac output  - Evaluate effectiveness of vasoactive medications to optimize hemodynamic stability  - Monitor arterial and/or venous puncture sites for bleeding and/or hematoma  - Assess quality of pulses, ski

## 2021-12-21 NOTE — PROGRESS NOTES
[place] [facility] - [facility]    Progress Note    Tricia Mullins Patient Status:  Inpatient    10/1/1990 MRN [id number]   Location [facility] 2W/SW Attending Meenakshi Denise, Neil [facility] Se Day # 12 PCP No primary care provider on file.      Subjec 12/21/2021 at 8:39 AM     Finalized by (CST): Daily Urbina MD on 12/21/2021 at 8:44 AM          XR CHEST AP PORTABLE  (CPT=71045)    Result Date: 12/20/2021  CONCLUSION:   1.  Mild cardiomegaly pulmonary vascular congestion and pulmonary haziness, pro measures 11 mm with slight progression. Left apical pneumothorax measures 17 mm with slight progression.   4. Remaining support tubes and catheters are satisfactory    Dictated by (CST): Margaret Ceja MD on 12/19/2021 at 5:27 PM     Finalized by (CST

## 2021-12-21 NOTE — RESPIRATORY THERAPY NOTE
Received patient on full vent support, VC+ 30/350/+12/100% with Ivelisse 40ppm.  Per Dr. Yaakov Giron, vent settings changed to 20/420/+14/100% with the same settings. Patient's saturations remained in mid 80s throughout the shift.   Breath sounds very diminished; roberto

## 2021-12-21 NOTE — PLAN OF CARE
Attempting to lighten sedation, restraints started for safety. Family aware.     Problem: Safety Risk - Non-Violent Restraints  Goal: Patient will remain free from self-harm  Description: INTERVENTIONS:  - Apply the least restrictive restraint to prevent ha

## 2021-12-21 NOTE — PROGRESS NOTES
[facility]    Progress Note    Leroy Neri Patient Status:  Inpatient    10/1/1990 MRN [id number]   Location [facility] 2W/SW Attending Margie Ozuna, [address] [facility] Se Day # 12 PCP No primary care provider on file.        Subjec 2.66*   GFRAA 22* 20* 35*   GFRNAA 19* 17* 30*   CA 7.7* 7.5* 7.4*   * 126* 127*   K 6.6* 6.4* 5.1   CL 96* 96* 95*   CO2 24.0 23.0 26.0          XR CHEST AP PORTABLE  (CPT=71045)    Result Date: 12/20/2021  CONCLUSION:   1.  Mild cardiomegaly pulmona tubes in place. Right apical pneumothorax measures 11 mm with slight progression. Left apical pneumothorax measures 17 mm with slight progression.   4. Remaining support tubes and catheters are satisfactory    Dictated by (CST): Jes Oquendo MD on

## 2021-12-21 NOTE — PROGRESS NOTES
[place] [facility]    Progress Note      Assessment and Plan:   1. 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 Much better oxygenated on ventilator. Bronchoalveolar lavage demonstrates typical organisms.   Chest x-ray Results   Component Value Date    WBC 17.0 12/21/2021    HGB 10.3 12/21/2021    HCT 30.6 12/21/2021    .0 12/21/2021    CREATSERUM 2.66 12/21/2021    BUN 24 12/21/2021     12/21/2021    K 5.1 12/21/2021    CL 95 12/21/2021    CO2 26.0 12/21/20

## 2021-12-21 NOTE — DIETARY NOTE
ADULT NUTRITION REASSESSMENT    Pt is at high nutrition risk. Pt does not meet malnutrition criteria.       RECOMMENDATIONS TO MD:  Resume TF when safe to resume    ADMITTING DIAGNOSIS:   Hypoxia [R09.02]  Pneumonia of both lungs due to infectious organism (12/21/21 1300)   • cisatracurium (NIMBEX) infusion Stopped (12/19/21 2016)   • vasopressin (PITRESSIN) infusion for septic shock 0.03 Units/min (12/21/21 0800)   • dextrose     • propofol 60 mcg/kg/min (12/21/21 1400)   • fentanyl Stopped (12/21/21 0737) Lower extremity and Hand (s) and Right Foot  per RN documentation  - Skin Integrity: at risk per RN documentation    ANTHROPOMETRICS:  HT: 175.3 cm (5' 9\")  WT: 82.9 kg (182 lb 12.2 oz)   BMI: Body mass index is 26.99 kg/m².  - 7# wt gain likely related to Coordination of nutrition care: collaboration with other providers  - Discharge and transfer of nutrition care to new setting or provider: monitor plans    MONITOR AND EVALUATE/NUTRITION GOALS:  - Food and Nutrient Intake:      Monitor: for PO initiation.

## 2021-12-21 NOTE — PROGRESS NOTES
[facility]ECTIOUS DISEASE PROGRESS NOTE  [facility]    Twila Burciagaho Patient Status:  Inpatient    10/1/1990 MRN [id number]   Location [facility] 5SW/SE Attending Aiden [facility] Day # 1 12/5 with fever, cough, shortness of breath for 1 week with previous strep and COVID-19 test negative. Fully vaccinated for COVID-19. Denies hemoptysis, chest pain, IV drug use, MSM.   During this admission has been febrile up to 103.3 with tachycardia an day #8  -  Will change Biktarvy to another ART  -  Start IV voriconazole. Check Fungal panel  -  Follow fever curve, wbc.   -  Reviewed labs, micro, imaging reports.  -  Case d/w RN, Pharm, father    Shakira Cavanaugh MD  [facility]

## 2021-12-22 NOTE — CM/SW NOTE
Care Progression Note:  Active Acute Medical Issue:   Hypoxia     Pt discussed during nursing rounds. Pt started CRRT. Remains intubated on ventilator support. Plan for tunneled HD cath 12/23.     Other Contributing Medical Factors/Dx:  HIV+, ARDS,       Co

## 2021-12-22 NOTE — PROGRESS NOTES
INFECTIOUS DISEASE PROGRESS NOTE  [facility]    Fredrick Notice Patient Status:  Inpatient    10/1/1990 MRN [id number]   Location [facility] 5SW/SE Attending [facility] Day # 1 12/5 with fever, cough, shortness of breath for 1 week with previous strep and COVID-19 test negative. Fully vaccinated for COVID-19. Denies hemoptysis, chest pain, IV drug use, MSM.   During this admission has been febrile up to 103.3 with tachycardia an day #8  -  Will change Biktarvy to another ART  -  Continue IV voriconazole - day #3. F/up on Fungal panel  -  Follow fever curve, wbc.   -  Reviewed labs, micro, imaging reports.  -  Case d/w RN, staff    Jen Hendrickson MD  [facility]

## 2021-12-22 NOTE — PROGRESS NOTES
[place] [facility]    Progress Note      Assessment and Plan:   1. 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 Much better oxygenated on ventilator. Bronchoalveolar lavage demonstrates typical organisms.   Chest x-ray edema. Neurologic sedated on ventilator.   Skin without gross abnormality     Results:     Lab Results   Component Value Date    WBC 10.6 12/22/2021    HGB 9.4 12/22/2021    HCT 27.8 12/22/2021    .0 12/22/2021    CREATSERUM 2.20 12/22/2021    BUN

## 2021-12-22 NOTE — PLAN OF CARE
Patient remains intubated and sedated. Bilateral soft wrist restraints in place.        Problem: Safety Risk - Non-Violent Restraints  Goal: Patient will remain free from self-harm  Description: INTERVENTIONS:  - Apply the least restrictive restraint to pre

## 2021-12-22 NOTE — PLAN OF CARE
Sedated on Propofol and restrained for safety and respiratory support  Vent settings unchanged, nitric @ 40, RR tachy  ST, no ectopy  Levo weaned as able, Vaso 0.03  Oliguric, dark  OG to LIS  Turned w/ bed assist, skin intact, generalized edema    CRRT: U assessment.  - Educate pt/family on patient safety including physical limitations  - Instruct pt to call for assistance with activity based on assessment  - Modify environment to reduce risk of injury  - Provide assistive devices as appropriate  - Consider range  Description: INTERVENTIONS:  - Monitor Blood Glucose as ordered  - Assess for signs and symptoms of hyperglycemia and hypoglycemia  - Administer ordered medications to maintain glucose within target range  - Assess barriers to adequate nutritional i Short Term Goal: \"get better,able to do things without getting short of breath    Interventions:   - Monitor vital signs  - Monitor appropriate labs  - Pain management  - Oxygen therapy as needed  - Administer medications per order  - Follow MD orders  - complex needs related to functional status, cognitive ability or social support system  Outcome: Not Progressing     Problem: Safety Risk - Non-Violent Restraints  Goal: Patient will remain free from self-harm  Description: INTERVENTIONS:  - Apply the leas

## 2021-12-22 NOTE — PLAN OF CARE
Patient remains intubated and sedated. Sedation transitioned from propofol to Precedex per Dr Dani Flores. FiO2 weaning in process, patient did have coughing episode and FiO2 increased due to slow SpO2 recovery, will continue to wean as able, see flowsheets.    C Goal  Description: Patient's Short Term Goal: \"get better,able to do things without getting short of breath    Interventions:   - Monitor vital signs  - Monitor appropriate labs  - Pain management  - Oxygen therapy as needed  - Administer medications per INTERVENTIONS:  - Identify barriers to discharge w/pt and caregiver  - Include patient/family/discharge partner in discharge planning  - Arrange for needed discharge resources and transportation as appropriate  - Identify discharge learning needs (meds, wo or absence of nausea and vomiting  Description: INTERVENTIONS:  - Maintain adequate hydration with IV or PO as ordered and tolerated  - Nasogastric tube to low intermittent suction as ordered  - Evaluate effectiveness of ordered antiemetic medications  - P restrictions as appropriate  Outcome: Progressing  Goal: Hemodynamic stability and optimal renal function maintained  Description: INTERVENTIONS:  - Monitor labs and assess for signs and symptoms of volume excess or deficit  - Monitor intake, output and pa

## 2021-12-22 NOTE — RESPIRATORY THERAPY NOTE
Received patient intubated/mechanically ventilated on full support. VC+20/420/+14/100%. ERICK at 40PPM. Suctioned provided as needed. No acute resp events overnight.  RT will continue to monitor

## 2021-12-22 NOTE — PLAN OF CARE
Patient continues to be sedated and unable to follow directions. Risk of lines and tubes being pulled if sedation vacation occurs. Restraints remain in place for patient safety. Frequent monitoring of patient needs and skin condition.        Problem: Safety

## 2021-12-22 NOTE — PROGRESS NOTES
[facility]    Progress Note    Judy Escalante Patient Status:  Inpatient    10/1/1990 MRN [id number]   Location [facility] 2W/SW Attending Arturo Zeng, [address] [facility] Se Day # 16 PCP No primary care provider on file.        Subjec CREATSERUM 4.19* 2.66* 2.20*   GFRAA 20* 35* 44*   GFRNAA 17* 30* 38*   CA 7.5* 7.4* 7.4*   * 127* 131*   K 6.4* 5.1 4.0   CL 96* 95* 98   CO2 23.0 26.0 27.0          XR CHEST AP PORTABLE  (CPT=71045)    Result Date: 12/22/2021  CONCLUSION:   Karlenebl dialysis catheter    Dictated by (CST): Alexander Russ MD on 12/20/2021 at 5:29 PM     Finalized by (CST): Alexander Russ MD on 12/20/2021 at 5:30 PM                Gail Sheehan MD  12/22/2021

## 2021-12-22 NOTE — PROGRESS NOTES
[place] [facility] - [facility]    Progress Note    Von Simon Patient Status:  Inpatient    10/1/1990 MRN [id number]   Location [facility] 2W/SW Attending Angelito Camarillo, [address] [facility]  Day # 16 PCP No primary care provider on file.      Subjec 12/21/2021  CONCLUSION:  1. Lines/tubes in stable customary positioning. 2. Small bilateral pneumothoraces are unchanged. No associated mediastinal shift.  3. Stable diffuse interstitial opacities throughout the lungs, which may relate to pulmonary edema i

## 2021-12-23 NOTE — PROGRESS NOTES
[place] [facility] - [facility]    Progress Note    Emogene Level Patient Status:  Inpatient    10/1/1990 MRN [id number]   Location [facility] 2W/SW Attending Neil [facility]ers [employer] [facility] Day # 25 PCP No primary care provider on file.        Subjec 201* 138* 116*   BUN 24* 17 14   CREATSERUM 2.66* 2.20* 1.91*   GFRAA 35* 44* 52*   GFRNAA 30* 38* 45*   CA 7.4* 7.4* 8.2*   * 131* 136   K 5.1 4.0 3.4*   CL 95* 98 100   CO2 26.0 27.0 28.0          XR CHEST AP PORTABLE  (CPT=71045)    Result Date: 1

## 2021-12-23 NOTE — PROGRESS NOTES
INFECTIOUS DISEASE PROGRESS NOTE  [facility] ID PROGRESS NOTE    Bertha Dakins Patient Status:  Inpatient    10/1/1990 MRN [id number]   Location [facility] 5SW/SE Attending Yahaira Cano [facility] Day # 1 shortness of breath for 1 week with previous strep and COVID-19 test negative. Fully vaccinated for COVID-19. Denies hemoptysis, chest pain, IV drug use, MSM.   During this admission has been febrile up to 103.3 with tachycardia and hypoxia requiring up t solumedrol 12/17  -  Continue on ganciclovir - day #11  -  Continue Biktarvy  -  Continue IV voriconazole - day #4. Fungal workup negative - will consider stopping soon  -  Follow fever curve, wbc.   -  Reviewed labs, micro, imaging reports.  -  Case d/w RN

## 2021-12-23 NOTE — PLAN OF CARE
Senekot given still with no bowel movement. Patient restless at ti8mes morphine and ativan did help. Patient had a tunneled dialysis catheter placed today and CRRT was off since 0800. Dialysis [facility] aware states will call for ETA.  Patient does not move ulcer development  - Assess and document skin integrity  - Monitor for areas of redness and/or skin breakdown  - Initiate interventions, skin care algorithm/standards of care as needed  Outcome: Progressing     Problem: CARDIOVASCULAR - ADULT  Goal: Christian Oates GASTROINTESTINAL - ADULT  Goal: Minimal or absence of nausea and vomiting  Description: INTERVENTIONS:  - Maintain adequate hydration with IV or PO as ordered and tolerated  - Nasogastric tube to low intermittent suction as ordered  - Evaluate effectivenes

## 2021-12-23 NOTE — PROGRESS NOTES
[place] [facility] - [facility]    Progress Note      Assessment and Plan:   1. 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 Much better oxygenated on ventilator. Bronchoalveolar lavage demonstrates typical organisms.   Chest x-ray hepatosplenomegaly and no mass appreciable. Extremities without clubbing cyanosis nor edema. Neurologic sedated on ventilator.   Skin without gross abnormality     Results:     Lab Results   Component Value Date    WBC 7.9 12/23/2021    HGB 8.6 12/23/20

## 2021-12-23 NOTE — PLAN OF CARE
Note: Magdaline Brittany remains sedated. Successfully transitioned from propofol to Precedex shortly before start of shift with no complications overnight. One dose of PRN Ativan administered for agitation brought on by coughing.  Ventilator settings unchanged ov Care Plan goals for specific interventions  Outcome: Progressing  Goal: Patient/Family Short Term Goal  Description: Patient's Short Term Goal: \"get better,able to do things without getting short of breath    Interventions:   - Monitor vital signs  - Yari ADULT  Goal: Skin integrity remains intact  Description: INTERVENTIONS  - Assess and document risk factors for pressure ulcer development  - Assess and document skin integrity  - Monitor for areas of redness and/or skin breakdown  - Initiate interventions, within normal limits  Description: INTERVENTIONS:  - Monitor labs and rhythm and assess patient for signs and symptoms of electrolyte imbalances  - Administer electrolyte replacement as ordered  - Monitor response to electrolyte replacements, including rhy on oxygen saturation or ABGs  - Provide Smoking Cessation handout, if applicable  - Encourage broncho-pulmonary hygiene including cough, deep breathe, Incentive Spirometry  - Assess the need for suctioning and perform as needed  - Assess and instruct to re

## 2021-12-23 NOTE — RESPIRATORY THERAPY NOTE
Patient received intubated with ETT 8.0 secured 25 cms at the lip on the following vent settings: PRVC 20/420/0.9/+12/100% with nitric (20 ppm) maintaining SpO2 of 90%. AARON BS heard and suctioned as needed.  Cannot wean nitric or FiO2 as patient's SpO2 (88-

## 2021-12-23 NOTE — PROGRESS NOTES
[place] [facility] - [facility]    Progress Note    Yvon Mark Patient Status:  Inpatient    10/1/1990 MRN [id number]   Location [facility] 2W/SW Attending Elsie Hernández, [address] [facility] Day # 25 PCP No primary care provider on file.      Subjec Don Pacheco MD on 12/23/2021 at 8:07 AM          XR CHEST AP PORTABLE  (CPT=71045)    Result Date: 12/22/2021  CONCLUSION:   Stable small biapical pneumothoraces with bilateral pleural drains in place.   Diffuse bilateral lung opacities not significantly vaughn

## 2021-12-23 NOTE — PLAN OF CARE
Bilateral soft wrist restraints remain in place and are being monitored and documented on.     Problem: Safety Risk - Non-Violent Restraints  Goal: Patient will remain free from self-harm  Description: INTERVENTIONS:  - Apply the least restrictive rest

## 2021-12-24 NOTE — PLAN OF CARE
No acute events overnight, plan of care discussed with father, all questions answered, no farther questions at this time. Friend at bedside over night, all questions answered. Will continue to monitor patient.       Problem: Patient Centered Care  Goal: Laretta Runner using appropriate pain scale  - Administer analgesics based on type and severity of pain and evaluate response  - Implement non-pharmacological measures as appropriate and evaluate response  - Consider cultural and social influences on pain and pain manage INTEGRITY - ADULT  Goal: Skin integrity remains intact  Description: INTERVENTIONS  - Assess and document risk factors for pressure ulcer development  - Assess and document skin integrity  - Monitor for areas of redness and/or skin breakdown  - Initiate in signs of decreased cardiac output  - Evaluate effectiveness of vasoactive medications to optimize hemodynamic stability  - Monitor arterial and/or venous puncture sites for bleeding and/or hematoma  - Assess quality of pulses, skin color and temperature  -

## 2021-12-24 NOTE — RESPIRATORY THERAPY NOTE
Pt on vent settings VC+ 20/420/100%/+12 with NO 20ppm. Began shift NO @30ppm, pt saturating 88-89%. Sats improved overnight to 93%; weaned NO accordingly; pt sats 91-93%, currently. BS diminished, rhonci bilaterally. Plan to wean NO as tolerable.  Will cont

## 2021-12-24 NOTE — PAYOR COMM NOTE
--------------  CONTINUED STAY REVIEW    Payor: Jesse Free #:  [id number]  Authorization Number: [id number]    Admit date: 12/5/21  Admit time:  5:45 PM    Admitting Physician: Brady Damon MD  Attending Physician:  Bayron Eldridge, himself. He stated that  he would want his dad.     5. Acute kidney injury–tolerating CRRT.     6. CODE STATUS–DNAR full treatment     Discussed issues with friend and father.         MEDICATIONS ADMINISTERED IN LAST 1 DAY:  acetaminophen (OFIRMEV) infus (Dosing Weight) Intravenous Madeleine Husain RN      Ganciclovir Sodium (CYTOVENE) 400 mg in sodium chloride 0.9% 100 mL IVPB     Date Action Dose Route User    12/23/2021 2230 New Bag 400 mg Intravenous Kisha De La Torre RN    12/23/2021 1100 New Bag 400 mg In Intravenous Arabella Barlow, BUDDY    12/23/2021 1700 Rate/Dose Verify 12 mcg/min Intravenous Elease Cain, BUDDY    12/23/2021 1600 Rate/Dose Verify 12 mcg/min Intravenous Elease Cain, BUDDY    12/23/2021 1500 Rate/Dose Change 12 mcg/min Intravenous Marino Morris Units Oral Arabella Barlow RN      Pantoprazole Sodium (PROTONIX) 40 mg in sodium chloride (PF) 0.9 % 10 mL IV push     Date Action Dose Route User    12/24/2021 0841 Given 40 mg Intravenous Arabella Barlow RN    12/23/2021 2018 Given 40 mg Intravenous Bus Units/min Intravenous Porsha Rivera RN    12/23/2021 1309 Rate/Dose Verify 0.03 Units/min Intravenous Porsha Rivera RN    12/23/2021 1155 Rate/Dose Verify 0.03 Units/min Intravenous Porsha Rivera RN    12/23/2021 1100 Rate/Dose Verify 0.03 Units/min 112 20 88/44 87 % — Ventilator — DG    12/23/21 1200 101.3 °F (38.5 °C) 121 31 91/54 87 % — Ventilator — DG    12/23/21 1100 100.8 °F (38.2 °C) 120 35 103/63 88 % — Ventilator — DG    12/23/21 1030 100.4 °F (38 °C) 121 35 91/73 87 % — Ventilator — DG    12

## 2021-12-24 NOTE — PROGRESS NOTES
Pulmonary/Critical Care Follow Up Note    HPI:   Cora Galeana is a 33year old male with Patient presents with:  Difficulty Breathing      PCP No primary care provider on file.   Admission Attending Romulo Rose MD    Hospital Day #19    N (ROBERT-SYNEPHRINE) 50 mg in sodium chloride 0.9% 250 mL infusion,  mcg/min, Intravenous, Continuous  •  dextrose 50 % injection 50 mL, 50 mL, Intravenous, PRN  •  Ganciclovir Sodium (CYTOVENE) 400 mg in sodium chloride 0.9% 100 mL IVPB, 5 mg/kg, Bailey Alexandre carbonate antacid (TUMS) chewable tab 1,000 mg, 1,000 mg, Oral, Q6H PRN  •  Bictegravir-Emtricitab-Tenofov -25 MG TABS 1 tablet, 1 tablet, Oral, Daily  •  sulfamethoxazole-trimethoprim (BACTRIM) 400 mg of trimethoprim in dextrose 5 % 500 mL IVPB, 5 m ID    B/L PTX  2/2 PCP  S/p b/l chest tube  Leak in R side   Plan cont chest tubes    FEDERICO  Plan RRT per renal         AIDS   New diagnosis  CD4 count of 85 and viral load 1.3 million  Started on biktarvy and now held  Plan per ID        DVT prophylaxis   L

## 2021-12-24 NOTE — PROGRESS NOTES
[place] [facility] - [facility]    Progress Note    [place] Holly Patient Status:  Inpatient    10/1/1990 MRN [id number]   Location [facility] 2W/SW Attending Kennedi Jordi, Neil [facility] Se Day # 23 PCP No primary care provider on file.      Subjec stable support devices as outlined above.     Dictated by (CST): Lauren Elise MD on 12/24/2021 at 8:11 AM     Finalized by (CST): Lauren Elise MD on 12/24/2021 at 8:16 AM          XR CHEST AP PORTABLE  (CPT=71045)    Result Date: 12/23/2021  CONCLUSION:

## 2021-12-24 NOTE — PROGRESS NOTES
[facility]    Progress Note    Kaushal Augustin Patient Status:  Inpatient    10/1/1990 MRN [id number]   Location [facility] 2W/SW Attending Shin العلي, [facility] Day # 23 PCP No primary care provider on file.        Subjec 19*   CREATSERUM 2.20* 1.91* 1.86*   GFRAA 44* 52* 54*   GFRNAA 38* 45* 46*   CA 7.4* 8.2* 8.5   * 136 135*   K 4.0 3.4* 3.4*   CL 98 100 101   CO2 27.0 28.0 27.0          XR CHEST AP PORTABLE  (CPT=71045)    Result Date: 12/24/2021  CONCLUSION:   Mi Finalized by (CST): Bertin Junior MD on 12/23/2021 at 8:07 AM          IR TUNNELED CV CATH INSERTION EXCHANGE CHECK    Result Date: 12/23/2021  CONCLUSION: Tunneled permacath placement for hemodialysis.       Dictated by (CST): Binu Willard MD

## 2021-12-25 NOTE — PROGRESS NOTES
[place] [facility] - [facility]     Progress Note        Ruth Hall Patient Status:  Inpatient    10/1/1990 MRN [id number]   Location [facility] 2W/SW Attending Jorden Crowell, [address] [facility] Se Day # 20 PCP No primary care provider on file.        Rosa Barnhart push, 2 mg, Intracatheter, Once PRN  NxStage Pure Flow 400 CRRT/PIRRT fluid 5000mL, 5 L/hr, CRRT, Continuous  senna (SENOKOT) syrup 8.8 mg, 5 mL, Oral, BID  Insulin Regular Human (NOVOLIN R) 100 UNIT/ML injection 1-5 Units, 1-5 Units, Subcutaneous, 4 times mg, Intravenous, Q4H PRN  ALPRAZolam (XANAX) tab 2 mg, 2 mg, Oral, QID PRN  sodium chloride (SALINE MIST) 1 spray, 1 spray, Each Nare, Q3H PRN  nystatin (MYCOSTATIN) suspension 500,000 Units, 5 mL, Oral, QID  calcium carbonate antacid (TUMS) chewable tab 1 12/25/2021    CA 8.6 12/25/2021    ALB 2.3 12/25/2021    MG 2.0 12/24/2021    PHOS 3.2 12/25/2021       US VENOUS DOPPLER LEG BILAT - DIAG IMG (CPT=93970)    Result Date: 12/25/2021  CONCLUSION:   Limited exam.  No evidence for right or left lower extremit related to ARDS. There is a focal consolidation in the right midlung which is also similar to prior, suggesting a superimposed infectious process. Small bilateral apical pneumothoraces are unchanged.      Dictated by (CST): Ed Cox MD on 12/23/2021

## 2021-12-25 NOTE — PLAN OF CARE
Patient has a hypertensive event over night, SBP in the 200's, event lasted about 45 min (See FlowSheet), MD Eunice Banuelos called, PRN Morphine given, levo & vaso paused. Levo and vaso back on after event resolved.  Blood pressure remained controled the rest of t interventions  Outcome: Progressing     Problem: PAIN - ADULT  Goal: Verbalizes/displays adequate comfort level or patient's stated pain goal  Description: INTERVENTIONS:  - Encourage pt to monitor pain and request assistance  - Assess pain using appropria needed  - Assess and instruct to report SOB or any respiratory difficulty  - Respiratory Therapy support as indicated  - Manage/alleviate anxiety  - Monitor for signs/symptoms of CO2 retention  Outcome: Progressing     Problem: SKIN/TISSUE INTEGRITY - ADUL balance  Outcome: Progressing     Problem: CARDIOVASCULAR - ADULT  Goal: Maintains optimal cardiac output and hemodynamic stability  Description: INTERVENTIONS:  - Monitor vital signs, rhythm, and trends  - Monitor for bleeding, hypotension and signs of de status, including labs, urine output, blood pressure (other measures as available)  - Encourage oral intake as appropriate  - Instruct patient on fluid and nutrition restrictions as appropriate  Outcome: Progressing

## 2021-12-25 NOTE — PROGRESS NOTES
INFECTIOUS DISEASE PROGRESS NOTE  [facility] ID PROGRESS NOTE    Lenaparish Ramsey Patient Status:  Inpatient    10/1/1990 MRN [id number]   Location [facility] 5SW/SE Attending Lara Obando[address] Day # 2 the hospital 12/5 with fever, cough, shortness of breath for 1 week with previous strep and COVID-19 test negative. Fully vaccinated for COVID-19. Denies hemoptysis, chest pain, IV drug use, MSM.   During this admission has been febrile up to 103.3 with t d-dimer. AC per ICU  -  Continue IV meropenem and vancomycin - day #9  -  FU BCx - ngtd  -  Continue on IV bactrim. Steroids increased to solumedrol 12/17  -  Continue on ganciclovir - day #13  -  Continue Biktarvy  -  Continue IV voriconazole - day #6.  Fu

## 2021-12-25 NOTE — RESPIRATORY THERAPY NOTE
Pt remains intubated/mechanically ventilated on full support. ERICK gradually weaned throughout the night. Attempted to shut off nitric completely but patient desat to the low 80's.  ERICK initiated, pt responded well with sats >90

## 2021-12-25 NOTE — PLAN OF CARE
Patient catheter very sluggish pressures elevated and had to TPA catheter. CRRT resumed at 0715. Patient still with gag and cough and pupils responsive on sedatives. Was able to wean nitric oxide today. Father updated by Dr. Garcia Necessary on progress.   Problem: Pa of injury  - Provide assistive devices as appropriate  - Consider OT/PT consult to assist with strengthening/mobility  - Encourage toileting schedule  Outcome: Progressing     Problem: RESPIRATORY - ADULT  Goal: Achieves optimal ventilation and oxygenation

## 2021-12-25 NOTE — DIETARY NOTE
ADULT NUTRITION REASSESSMENT    Pt is at high nutrition risk. Pt does not meet malnutrition criteria. RECOMMENDATIONS TO MD:  See Nutrition Intervention for TF specifics  - restart TF at 20 ml/hr - will advance as appropriate.  Adjusted to non-renal f K and Ph dropping, will restart TF with non-renal formula and monitor need to adjust given CRRT. Noted propofol no longer infusing, now on precedex.      FOOD/NUTRITION RELATED HISTORY:  Appetite: good appetite PTA  Intake: NPO now  Intake Meeting Needs: No --    *   < > 136 135*  --  136   K 4.0   < > 3.4* 3.4*  --  3.9   CL 98   < > 100 101  --  101   CO2 27.0   < > 28.0 27.0  --  28.0   PHOS 4.0  --   --  4.0  --  3.2   OSMOCALC 276   < > 283 284  --  285    < > = values in this interval not displaye Resumed at trickle rate Jevity 1.2 at 20 ml/hr per NG tube with 2 pkts prosource. RD to monitor tolerance and advance as appropriate. Based on average 22 hour infusion time.   Goal rate provides 608 kcal, 46 grams protein, 445 ml total free water, and 100%

## 2021-12-25 NOTE — RESPIRATORY THERAPY NOTE
Patient remains on full vent support with the following settings VC+/RR20//+12/100%. Patient has a ETT size [employer]. Suction provided as needed. Patient saturating well. Will continue to monitor.

## 2021-12-25 NOTE — RESPIRATORY THERAPY NOTE
Nitric is now off, patient is tolerating well. Patient is saturating well, Spo2 96%. RN Ishan aware. Will continue to monitor pt.

## 2021-12-25 NOTE — PROGRESS NOTES
[place] [facility] - [facility]    Progress Note    Fredrick Notice Patient Status:  Inpatient    10/1/1990 MRN [id number]   Location [facility] 2W/SW Attending Roni President, [address] Day # 20 PCP No primary care provider on file.        Subjec MG 2.0 12/24/2021    PHOS 3.2 12/25/2021    TROP <0.045 12/05/2021       XR CHEST AP PORTABLE  (CPT=71045)    Result Date: 12/24/2021  CONCLUSION:   Mild-to-moderate cardiomegaly. Small bilateral pneumothoraces, slightly decreased on the left.   Bilater

## 2021-12-26 NOTE — PLAN OF CARE
Note: Priscilla Casas remains sedated with Precedex infusing. PEEP 12, FiO2 100%. Attempted to wean FiO2 down to 95%, then 90%, which he tolerated somewhat well. However, it was then noted by the RT that the nitric oxide wasn't turned off as previously thought. Goal  Description: Patient's Short Term Goal: \"get better,able to do things without getting short of breath    Interventions:   - Monitor vital signs  - Monitor appropriate labs  - Pain management  - Oxygen therapy as needed  - Administer medications per factors for pressure ulcer development  - Assess and document skin integrity  - Monitor for areas of redness and/or skin breakdown  - Initiate interventions, skin care algorithm/standards of care as needed  Outcome: Progressing     Problem: Fletcher Navarro supplementation based on oxygen saturation or ABGs  - Provide Smoking Cessation handout, if applicable  - Encourage broncho-pulmonary hygiene including cough, deep breathe, Incentive Spirometry  - Assess the need for suctioning and perform as needed  - Ass trend weights  Outcome: Not Progressing     Problem: METABOLIC/FLUID AND ELECTROLYTES - ADULT  Goal: Hemodynamic stability and optimal renal function maintained  Description: INTERVENTIONS:  - Monitor labs and assess for signs and symptoms of volume excess

## 2021-12-26 NOTE — PLAN OF CARE
Patient pupils are fixed and dilated at 6 mm. This is a new finding for me as before his pupils were responsive Thursday and Friday.  Oxygenating better today and will try to wean down precedex gtt, Father at bedside and explained findings Dr. Kendall Black made minimize respiratory effort  - Oxygen supplementation based on oxygen saturation or ABGs  - Provide Smoking Cessation handout, if applicable  - Encourage broncho-pulmonary hygiene including cough, deep breathe, Incentive Spirometry  - Assess the need for s weights  Outcome: Progressing     Problem: METABOLIC/FLUID AND ELECTROLYTES - ADULT  Goal: Electrolytes maintained within normal limits  Description: INTERVENTIONS:  - Monitor labs and rhythm and assess patient for signs and symptoms of electrolyte imbalan

## 2021-12-26 NOTE — PROGRESS NOTES
[place] [facility] - [facility]     Progress Note        German Baker Patient Status:  Inpatient    10/1/1990 MRN [id number]   Location [facility] 2W/SW Attending Nikita Prescott, [address] [facility] Day # 21 PCP No primary care provider on file.        Mayda Chavez fluid 5000mL, 5 L/hr, CRRT, Continuous  senna (SENOKOT) syrup 8.8 mg, 5 mL, Oral, BID  Insulin Regular Human (NOVOLIN R) 100 UNIT/ML injection 1-5 Units, 1-5 Units, Subcutaneous, 4 times per day  phenylephrine HCl (ROBETR-SYNEPHRINE) 50 mg in sodium chloride PRN  sodium chloride (SALINE MIST) 1 spray, 1 spray, Each Nare, Q3H PRN  nystatin (MYCOSTATIN) suspension 500,000 Units, 5 mL, Oral, QID  calcium carbonate antacid (TUMS) chewable tab 1,000 mg, 1,000 mg, Oral, Q6H PRN  Bictegravir-Emtricitab-Tenofov  ALKPHO 130 12/26/2021    BILT 0.8 12/26/2021    TP 6.5 12/26/2021    AST 56 12/26/2021    ALT 61 12/26/2021       US VENOUS DOPPLER LEG BILAT - DIAG IMG (CPT=93970)    Result Date: 12/25/2021  CONCLUSION:   Limited exam.  No evidence for right or left lowe strategy for weaning.   Attempt to wean off nitric oxide  -Continue broad-spectrum antibiotic and antiviral therapy per ID recommendations  -Attempt to wean norepinephrine and vasopressin for pressor support  -Renal replacement therapy per nephrology recomm

## 2021-12-26 NOTE — PROGRESS NOTES
[place] [facility] - [facility]    Progress Note    [place] [facility] Patient Status:  Inpatient    10/1/1990 MRN [id number]   Location [facility] 2W/SW Attending Veronica Villarreal, [address] [facility] [address] Day # 21 PCP No primary care provider on file.        Subjec MG 2.0 12/24/2021    PHOS 3.2 12/25/2021    TROP <0.045 12/05/2021       US VENOUS DOPPLER LEG BILAT - DIAG IMG (CPT=93970)    Result Date: 12/25/2021  CONCLUSION:   Limited exam.  No evidence for right or left lower extremity DVT.     Dictated by (CST): Butch

## 2021-12-26 NOTE — PROGRESS NOTES
Dr. Dory Loyd  Paged and called back spoke with father regarding prognosis and plan. Pt too unstable for a CT of head at this time.  K+ level 2.9 on abg Dr. Lukas Ledezma paged and informed lab draw in am, and ordered to continue CRRT

## 2021-12-27 NOTE — PROGRESS NOTES
Pulmonary/Critical Care Follow Up Note    HPI:   Satish Lacy is a 33year old male with Patient presents with:  Difficulty Breathing      PCP No primary care provider on file.   Admission Attending Angela Roa 15 Day #22 Intravenous, Continuous  •  dextrose 50 % injection 50 mL, 50 mL, Intravenous, PRN  •  Ganciclovir Sodium (CYTOVENE) 400 mg in sodium chloride 0.9% 100 mL IVPB, 5 mg/kg, Intravenous, Q12H  •  Meropenem (MERREM) 500 mg in sodium chloride 0.9% 100 mL IVPB-MB Bictegravir-Emtricitab-Tenofov -25 MG TABS 1 tablet, 1 tablet, Oral, Daily  •  sulfamethoxazole-trimethoprim (BACTRIM) 400 mg of trimethoprim in dextrose 5 % 500 mL IVPB, 5 mg/kg of trimethoprim, Intravenous, Q8H  •  influenza vaccine split quad (FLU shock  Max pressors x 3  PCP, CMV  Plan    wean pressors as tolerated   IV abx/antimicrobials per ID    B/L PTX  2/2 PCP  S/p b/l chest tube  Leak in R side   Plan cont chest tubes    FEDERICO  Plan RRT per renal      AIDS   New diagnosis  CD4 count of 85 and v

## 2021-12-27 NOTE — RESPIRATORY THERAPY NOTE
Pt on current vent settings PC/30/P29/+12/100% ; ETT 8 @ 25 cm. Pt switched from + to [facility] [place] after constant high peak pressures. Suctioned pt as needed throughout the night. Pt tolerating and saturating appropriately. RT to continue to monitor.

## 2021-12-27 NOTE — PROGRESS NOTES
INFECTIOUS DISEASE PROGRESS NOTE  [facility]    Jordis Show Patient Status:  Inpatient    10/1/1990 MRN [id number]   Location [facility] 5SW/SE Attending Cheryl Chinchilla[address] Day # 2 of alcohol use in father and a personal history of asthma as a child as well as previous tobacco use now presents to the hospital 12/5 with fever, cough, shortness of breath for 1 week with previous strep and COVID-19 test negative.   Fully vaccinated for C 12/18  # Soft tissue emphysema neck L CHEST WALL, pneumomediastinum     PLAN:  -  Continue IV meropenem and vancomycin - day #11.  -  Continue on voriconazole. -  Continue on IV bactrim.  Steroids increased to solumedrol 12/17.  -  Continue on ganciclovir

## 2021-12-27 NOTE — PLAN OF CARE
Note: Neurological status remains unchanged. Pupils remain fixed, dilated, and, at times, irregular in shape.  No gag, cough, or corneal reflexes observed, and the patient has no visible movements or response to painful stimulus with no sedation in dhruv injury  Description: INTERVENTIONS:  - Assess pt frequently for physical needs  - Identify cognitive and physical deficits and behaviors that affect risk of falls.   - [place] fall precautions as indicated by assessment.  - Educate pt/family on patient sa pressure ulcer development  - Assess and document skin integrity  - Monitor for areas of redness and/or skin breakdown  - Initiate interventions, skin care algorithm/standards of care as needed  Outcome: Not Progressing     Problem: 98 Radha Luo stability  - Monitor arterial and/or venous puncture sites for bleeding and/or hematoma  - Assess quality of pulses, skin color and temperature  - Assess for signs of decreased coronary artery perfusion - ex.  Angina  - Evaluate fluid balance, assess for ed

## 2021-12-27 NOTE — PLAN OF CARE
Anika Griffin remains intubated, on vasopressor support, with CRRT running. Vital signs as charted. Pupils remain dilated and fixed, no cough, gag, or corneal reflex, no withdrawal from painful stimuli.  Dr. Astrid Salazar discussed prognosis and likelihood of brain death per order  - Follow MD orders  - Diagnostics per order  - Update / inform patient on plan of care  - See additional Care Plan goals for specific interventions  Outcome: Not Progressing     Problem: PAIN - ADULT  Goal: Verbalizes/displays adequate comfort l if applicable  - Encourage broncho-pulmonary hygiene including cough, deep breathe, Incentive Spirometry  - Assess the need for suctioning and perform as needed  - Assess and instruct to report SOB or any respiratory difficulty  - Respiratory Therapy suppo Provide nonpharmacologic comfort measures as appropriate  - Advance diet as tolerated, if ordered  - Obtain nutritional consult as needed  - Evaluate fluid balance  Outcome: Not Progressing     Problem: CARDIOVASCULAR - ADULT  Goal: Maintains optimal cardi output and patient weight  - Monitor urine specific gravity, serum osmolarity and serum sodium as indicated or ordered  - Monitor response to interventions for patient's volume status, including labs, urine output, blood pressure (other measures as availab

## 2021-12-27 NOTE — PROGRESS NOTES
BRIEF PROGRESS NOTE    Patient seen and examined face-to-face in person in his room 204 at [facility] on 12/27/2021. Chart reviewed. Spoke with dayshift nurse Bonnie Perera, and with patient’s family.     Patient still unresponsive on full ventilator suppor

## 2021-12-27 NOTE — SPIRITUAL CARE NOTE
Patient Anointed by Izzy Banda of [facility]      Nurse requested spiritual support for family (father, sister and friend) at bedside. Patient is unresponsive (ventilitor). Family at bedside talking among themselves.  Chaplain solomon

## 2021-12-27 NOTE — PROGRESS NOTES
[place] [facility] - [facility]    Progress Note    Mynor Vasquez Patient Status:  Inpatient    10/1/1990 MRN [id number]   Location [facility] 2W/SW Attending Polly Lay MD   [address] Day # 25 PCP No primary care provider on file.        Subjec 12/26/2021    DDIMER 5.47 (H) 12/23/2021    ESRML 61 (H) 12/05/2021    CRP 10.10 (H) 12/05/2021    MG 1.6 12/27/2021    PHOS 1.5 (L) 12/27/2021    TROP <0.045 12/05/2021       US VENOUS DOPPLER LEG BILAT - DIAG IMG (CPT=93970)    Result Date: 12/25/2021  C

## 2021-12-27 NOTE — CONSULTS
[facility]  [address] [facility]  [phone]        [address]    Report of Consultation  Scottie Hernández Patient Status:  Inpatient     10/1/1990 MRN were due to severe to declare him brain dead. We even repeated his ABG, which was worse. Initially his ABG was 7. 11. Repeat HBG was 7.08.     Regardless, explained that overall he was doing poorly medically, and the likelihood that he would have a significa @FLOWCHS(14)@    Exam:  - General: cachectic and no distress. Chronically ill man lying supine in bed. He is intubated. He is pale. - CV: S1, S2 normal  -Pulmonary: Mechanical breath sounds  Neurologic Exam  - Mental Status: He is not on any sedation. DECLOT line, dextrose, alteplase (CATHFLO) IV push 2 mg to DECLOT line, dextrose, cisatracurium (NIMBEX) bolus from bag, ipratropium-albuterol, ibuprofen, dextrose, acetaminophen, LORazepam, morphINE sulfate, ALPRAZolam, sodium chloride, calcium carbonate 130 (H) 70 - 99 mg/dL    Sodium 136 136 - 145 mmol/L    Potassium 3.9 3.5 - 5.1 mmol/L    Chloride 101 98 - 112 mmol/L    CO2 28.0 21.0 - 32.0 mmol/L    Anion Gap 7 0 - 18 mmol/L    BUN 16 7 - 18 mg/dL    Creatinine 1.40 (H) 0.70 - 1.30 mg/dL    BUN/CREA R Collection Time: 12/25/21 12:14 PM   Result Value Ref Range    POC Glucose  127 (H) 70 - 99 mg/dL   POCT Glucose    Collection Time: 12/25/21  6:07 PM   Result Value Ref Range    POC Glucose  127 (H) 70 - 99 mg/dL   POCT Glucose    Collection Time: 12/26/2 Neutrophil % 97.3 %    Lymphocyte % 1.4 %    Monocyte % 0.4 %    Eosinophil % 0.0 %    Basophil % 0.1 %    Immature Granulocyte % 0.8 %   POCT Glucose    Collection Time: 12/26/21  5:29 AM   Result Value Ref Range    POC Glucose  170 (H) 70 - 99 mg/dL   PO 112 mmol/L    CO2 31.0 21.0 - 32.0 mmol/L    Anion Gap 2 0 - 18 mmol/L    BUN 11 7 - 18 mg/dL    Creatinine 1.27 0.70 - 1.30 mg/dL    BUN/CREA Ratio 8.7 (L) 10.0 - 20.0    Calcium, Total 8.4 (L) 8.5 - 10.1 mg/dL    Calculated Osmolality 281 275 - 295 mOsm/ extending superiorly as well as new subcutaneous emphysema in the left and to a lesser degree the right lateral neck. Follow-up studies are advised.     Dictated by (CST): Crystal Gaytan MD on 12/27/2021 at 9:39 AM     Finalized by (CST): Crystal Gaytan MD obtained. 1. Concern for brain death  Cannot be diagnosed with brain death given his severe metabolic derangements. It is unlikely that these derangements will improve significantly. Diagnostics:  1.  Head CT or MRI if patient improves/b

## 2021-12-28 NOTE — PROGRESS NOTES
[place] [facility] - [facility]    Progress Note    Bettie Sena Patient Status:  Inpatient    10/1/1990 MRN [id number]   Location [facility] 2W/SW Attending Josh Alexandre, [address] [facility] [address] Day # 23 PCP No primary care provider on file.        Subjec ALKPHO 130 (H) 12/26/2021    BILT 0.8 12/26/2021    TP 6.5 12/26/2021    AST 56 (H) 12/26/2021    ALT 61 12/26/2021    DDIMER 5.47 (H) 12/23/2021    ESRML 61 (H) 12/05/2021    CRP 10.10 (H) 12/05/2021    MG 1.6 12/27/2021    PHOS 1.3 (L) 12/28/2021    T

## 2021-12-28 NOTE — PROGRESS NOTES
INFECTIOUS DISEASE PROGRESS NOTE  [facility]ESS [facility]    Horacio Chavez Patient Status:  Inpatient    10/1/1990 MRN [id number]   Location [facility] 5SW/SE Attending Halina Moctezuma [facility] Day # 2 alcohol use in father and a personal history of asthma as a child as well as previous tobacco use now presents to the hospital 12/5 with fever, cough, shortness of breath for 1 week with previous strep and COVID-19 test negative.   Fully vaccinated for COVI 12/18  # Soft tissue emphysema neck L CHEST WALL, pneumomediastinum     PLAN:  -  Continue IV vancomycin, meropenem, and bactrim. -  Continue on voriconazole. -  Continue on ganciclovir. -  Bipin Luis on hold as patient is NPO.  -  Prognosis poor.    -  Fo

## 2021-12-28 NOTE — PROGRESS NOTES
[facility]    [facility] Note    Horacio Chavez Patient Status:  Inpatient    10/1/1990 MRN [id number]   Location [facility] 2W/SW Attending Fawn Chung, [address] [facility] [address] Day # 23 PCP No primary care provider on file.      Subjec atypical pneumonia or ARDS. Dictated by (CST): Eugene Barrera MD on 12/28/2021 at 8:16 AM     Finalized by (CST): Eugene Barrera MD on 12/28/2021 at 8:20 AM          XR CHEST AP PORTABLE  (CPT=71045)    Result Date: 12/27/2021  CONCLUSION:  1.  R

## 2021-12-28 NOTE — RESPIRATORY THERAPY NOTE
Pt remain stable on ventilator setting of PC 29 RR 30 FiO2 100% Peep 12. Order received to change vent setting to RR 35  FiO2 100% Peep 12. But the PIP was at 64 and Plateau pressure at 60 when made to vent changes.      Vent placed back on PC set

## 2021-12-28 NOTE — PLAN OF CARE
Comatose off all sedation, fixed pupils  Vent settings unchanged, +12/100%, monitoring for possible cuff leak w/ positioning  Maxed on Levo/Vaso, up titrated Aakash for BP response  5ml black urine throughout night  CT x2 output as charted, serous  CRRT noa pain goal  Description: INTERVENTIONS:  - Encourage pt to monitor pain and request assistance  - Assess pain using appropriate pain scale  - Administer analgesics based on type and severity of pain and evaluate response  - Implement non-pharmacological johanny Manage/alleviate anxiety  - Monitor for signs/symptoms of CO2 retention  Outcome: Not Progressing     Problem: SKIN/TISSUE INTEGRITY - ADULT  Goal: Skin integrity remains intact  Description: INTERVENTIONS  - Assess and document risk factors for pressure u hemodynamic stability  Description: INTERVENTIONS:  - Monitor vital signs, rhythm, and trends  - Monitor for bleeding, hypotension and signs of decreased cardiac output  - Evaluate effectiveness of vasoactive medications to optimize hemodynamic stability oral intake as appropriate  - Instruct patient on fluid and nutrition restrictions as appropriate  Outcome: Not Progressing

## 2021-12-28 NOTE — PROGRESS NOTES
BRIEF PROGRESS NOTE    Patient seen and examined face-to-face in person in his room 204 at [address] on 12/28/2021. Discussed With nurse Samantha Garza, respiratory therapy Codi Dillon and pulmonary Dr. Felipa Casey. Seen by neurology Dr. Jake Arnett.   Brain death ap

## 2021-12-28 NOTE — PLAN OF CARE
Orders received by nephrology to not resume CRRT after current filter expires or clots, however official GOC have not been decided by father. Remains completely neurologically unresponsive. Able to pull 50-100cc fluid off per hour with crrt today.  Remains Not Progressing     Problem: PAIN - ADULT  Goal: Verbalizes/displays adequate comfort level or patient's stated pain goal  Description: INTERVENTIONS:  - Encourage pt to monitor pain and request assistance  - Assess pain using appropriate pain scale  - Adm Assess and instruct to report SOB or any respiratory difficulty  - Respiratory Therapy support as indicated  - Manage/alleviate anxiety  - Monitor for signs/symptoms of CO2 retention  Outcome: Not Progressing     Problem: SKIN/TISSUE [address] balance  Outcome: Not Progressing     Problem: CARDIOVASCULAR - ADULT  Goal: Maintains optimal cardiac output and hemodynamic stability  Description: INTERVENTIONS:  - Monitor vital signs, rhythm, and trends  - Monitor for bleeding, hypotension and signs o patient's volume status, including labs, urine output, blood pressure (other measures as available)  - Encourage oral intake as appropriate  - Instruct patient on fluid and nutrition restrictions as appropriate  Outcome: Not Progressing

## 2021-12-28 NOTE — PROGRESS NOTES
Brief neurology progress note    Patient's pH was 7.08. His metabolic transfer to severe for him to be declared brain dead.   However, emphasized the family that overall it appears medically he is declining, and his acidosis is a manifestation of his globa

## 2021-12-28 NOTE — PROGRESS NOTES
Family elected to change patient's code status to DNAR/selective. Only want medications, NO CPR or defib at this time.  Discussed with Nocturnalist Dr. Corrie Sanches

## 2021-12-28 NOTE — PAYOR COMM NOTE
--------------  CONTINUED STAY REVIEW    Payor: Kaelyn Hernandez #:  [id number]  Authorization Number: [id number]    Admit date: 12/5/21  Admit time:  5:45 PM    Admitting Physician: Melita Ramires MD  Attending Physician:  Mk Buenrostro, brain    MEDICATIONS ADMINISTERED IN LAST 1 DAY:  artificial tears 83-15 % ophthalmic ointment 1 Application     Date Action Dose Route User    12/28/2021 3629 Given 1 Application Both Eyes Isis Krause RN      Ganciclovir Sodium (CYTOVENE) 400 mg in so [id number] [address] 5 L/hr CRRT Fer Gan, BUDDY    12/28/2021 0028 New Bag 5 L/hr CRRT Caio Prluly, RN    12/28/2021 3726 New Bag 5 L/hr CRRT Caio Prude, RN    12/28/2021 0026 New Bag 5 L/hr CRRT Caio Prude, RN    12/28/2021 0025 New Bag 5 L/hr CRRT Intravenous Jd Elizalde, RN    12/28/2021 0014 Rate/Dose Change 150 mcg/min Intravenous Jd Elizalde, RN    12/27/2021 1800 Rate/Dose Verify 100 mcg/min Intravenous Cam Carpio RN    12/27/2021 1600 Rate/Dose Verify 100 mcg/min Intravenous Bora Elliott 12/28/21 1300 97.5 °F (36.4 °C) 115 30 99/54 93 % — Ventilator — LG    12/28/21 1200 97.7 °F (36.5 °C) 115 30 95/53 93 % — Ventilator — LG    12/28/21 1100 97.9 °F (36.6 °C) 116 30 96/53 92 % — Ventilator — LG    12/28/21 1032 — 117 30 — 92 % — — — AD    1 97.7 °F (36.5 °C) 121 30 108/59 94 % — Ventilator — IB    12/27/21 1000 97.7 °F (36.5 °C) 121 30 104/56 95 % — Ventilator — IB    12/27/21 0900 97.9 °F (36.6 °C) 120 30 96/51 95 % — Ventilator — IB    12/27/21 0800 98.1 °F (36.7 °C) 121 30 96/54 95 % — Neal

## 2021-12-29 NOTE — RESPIRATORY THERAPY NOTE
Pt intubated with ETT size 8.0 @ 25 cm at the lip, on full support with the following vent settings: PC/AC 30/ IP 29/+12/100%. Pt saturating 96%, suction as needed. ABG done @ 1600, results as follow:      Ref.  Range 12/28/2021 15:17   ABG PH Latest Ref Ra

## 2021-12-29 NOTE — PLAN OF CARE
Comatose without reflexes, fixed pupils, off sedation  Vent settings unchanged  CRRT continued, was able to pull off fluid for net goal 0 until about 0100 when BP's started to drop. UF rate decreased while Aakash increased with minimal response.  UF down to 0 interventions  Outcome: Not Progressing     Problem: PAIN - ADULT  Goal: Verbalizes/displays adequate comfort level or patient's stated pain goal  Description: INTERVENTIONS:  - Encourage pt to monitor pain and request assistance  - Assess pain using appro perform as needed  - Assess and instruct to report SOB or any respiratory difficulty  - Respiratory Therapy support as indicated  - Manage/alleviate anxiety  - Monitor for signs/symptoms of CO2 retention  Outcome: Not Progressing     Problem: SKIN/TISSUE I Evaluate fluid balance  Outcome: Not Progressing     Problem: CARDIOVASCULAR - ADULT  Goal: Maintains optimal cardiac output and hemodynamic stability  Description: INTERVENTIONS:  - Monitor vital signs, rhythm, and trends  - Monitor for bleeding, hypotens interventions for patient's volume status, including labs, urine output, blood pressure (other measures as available)  - Encourage oral intake as appropriate  - Instruct patient on fluid and nutrition restrictions as appropriate  Outcome: Not Progressing

## 2021-12-29 NOTE — PROGRESS NOTES
Pulmonary/Critical Care Follow Up Note    HPI:   Bertha Dakins is a 33year old male with Patient presents with:  Difficulty Breathing      PCP No primary care provider on file.   Admission Attending Angela Donis 15 Day #24 Bitartrate (LEVOPHED) 32 mg in dextrose 5 % 250 mL infusion, 0.5-30 mcg/min, Intravenous, Continuous  •  vancomycin (FIRVANQ) 50 MG/ML oral solution 125 mg, 125 mg, Per NG Tube, Daily  •  CISATRACURIUM BOLUS FROM BAG 15.9 mg infusion, 0.2 mg/kg (Dosing Shandra Saenz PRN      Allergies:  No Known Allergies        PHYSICAL EXAM:   Blood pressure 97/50, pulse 113, temperature 98.2 °F (36.8 °C), temperature source Bladder, resp. rate (!) 0, height 5' 9\" (1.753 m), weight 182 lb 12.2 oz (82.9 kg), SpO2 93 %.     Intake/Out support         Called and discussed with father  He understands that his son has close to zero   He was hoping he would \"do it on his own\"  Rec       Rec w/d support   He wants to speak to his dtr first    39 in 503 Natan Perales MD

## 2021-12-29 NOTE — PROGRESS NOTES
[facility] - [facility]    Progress Note    Belle Valleymicha Kilgorejaswant Patient Status:  Inpatient    10/1/1990 MRN [id number]   Location [facility] 2W/SW Attending Lex Valverde, [address] [facility] Day # 24 PCP No primary care provider on file.        Subjec CHEST AP PORTABLE  (CPT=71045)    Result Date: 12/28/2021  CONCLUSION:  1. Lines/tubes in stable customary positioning. 2. Small bilateral pneumothoraces are grossly unchanged. Pigtail pleural drainage catheters are in stable positioning.   No mediastinal

## 2021-12-30 NOTE — DISCHARGE PLANNING
Clau Yañez father at bedside with daughter stated that they would like to terminally wean patient from the vent and turn off medications for blood pressure support

## 2021-12-30 NOTE — PROGRESS NOTES
Pressors turned off. CRRT stopped and blood returned. Extubated for terminal wean. Dr. Breanna Finney approved. Father and sister at bedside.

## 2021-12-30 NOTE — PLAN OF CARE
Patient unresponsive. No gag, cough, or corneal, pupils 6 fixed and dilated not assisting the vent.    Problem: PAIN - ADULT  Goal: Verbalizes/displays adequate comfort level or patient's stated pain goal  Description: INTERVENTIONS:  - Encourage pt to [facility] affect risk of falls.   - [place] fall precautions as indicated by assessment.  - Educate pt/family on patient safety including physical limitations  - Instruct pt to call for assistance with activity based on assessment  - Modify environment to reduce ri ordered  - Obtain nutritional consult as needed  - Evaluate fluid balance  Outcome: Not Progressing     Problem: METABOLIC/FLUID AND ELECTROLYTES - ADULT  Goal: Hemodynamic stability and optimal renal function maintained  Description: INTERVENTIONS:  - Mon

## 2021-12-30 NOTE — PAYOR COMM NOTE
--------------  DISCHARGE REVIEW    Payor: Andrew Beka #:  [id number]  Authorization Number: [id number]    Admit date: 21  Admit time:   5:45 PM  Discharge Date: 2021  9:15 PM      Admitting Physician: Vedia Sicard, MD

## 2021-12-31 NOTE — PROGRESS NOTES
BRIEF PROGRESS NOTE    Patient seen and examined face-to-face in person and his room 204 at [address] on 12/29/2021. Chart reviewed. Discussed with dayshift nurse.     Unresponsive, motionless, with no spontaneous breathing, no gag reflex, pup

## 2022-01-04 ENCOUNTER — TELEPHONE (OUTPATIENT)
Dept: PULMONOLOGY | Facility: CLINIC | Age: 32
End: 2022-01-04

## 2022-01-08 NOTE — DISCHARGE SUMMARY
[facility] - [facility]    Discharge Summary    Kobi Mcgowan Patient Status:  Inpatient    10/1/1990 MRN [id number]   Location [facility] 2W/SW Attending No att. providers found   [facility] Day # 24 PCP No primary care provider on file.      Chasity Simon support stopped and he  shortly thereafter. Consultations: Pulmonary Dr. Allen Mccracken and associates, ID Dr. Dena Coyle and associates, nephrology Dr. Severo Matas, neurology Dr. Deirdre Ramsey, psychiatry Dr. Alin Roe.     Procedures: Bronchoscopy, bilateral tube thoracos

## 2022-01-30 NOTE — PROGRESS NOTES
PROGRESS NOTE    Patient seen and examined face-to-face in person and his room 204 at [address] [place] [address] on 12/29/2021. Chart reviewed. Discussed with dayshift nurse.      HISTORY      CC/HPI:  Unresponsive, motionless, with no spontaneous breathing, n respiratory support. · Renal failure - Continue hemodialysis until family requests termination of respiratory support. 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 Anticipate family request for terminal ventilator weaning.       DISPOSITION    ·

## 2022-01-30 NOTE — PROGRESS NOTES
PROGRESS NOTE    Patient seen and examined face-to-face in person in his room 204 at [address] [place] [address] on 12/28/2021. Discussed with nurse Ana Montes, respiratory therapy Alexx Godwin and pulmonary Dr. Juhi Johns.      HISTORY      CC/HPI:  Seen by neurology Dr. Ranjit Zhang with tube thoracostomy and PleurEvac. · Hypotension - Continue circulatory support for now. · Renal failure - Continue hemodialysis for now. 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   Anticipate family request for terminal ventilator weaning

## 2022-01-30 NOTE — H&P
INITIAL MEDICAL VISIT    Patient seen and examined face-to-face in person in his room 204 at [facility] on 12/27/2021. Chart reviewed. Spoke with dayshift nurse Elio Arroyo, and with patient’s family.      HISTORY      CC/HPI:  33year-old man unresponsiv oxygen. 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 Treated accordingly. · Bilateral pneumothorax - Lungs reexpanded with tube thoracostomy and PleurEvac. · Hypotension - Continue circulatory support. · Renal failure - Continue hemodialysis.   ·
